# Patient Record
Sex: FEMALE | Race: BLACK OR AFRICAN AMERICAN | NOT HISPANIC OR LATINO | Employment: FULL TIME | URBAN - METROPOLITAN AREA
[De-identification: names, ages, dates, MRNs, and addresses within clinical notes are randomized per-mention and may not be internally consistent; named-entity substitution may affect disease eponyms.]

---

## 2017-01-24 RX ORDER — ATENOLOL AND CHLORTHALIDONE TABLET 50; 25 MG/1; MG/1
TABLET ORAL
Qty: 90 TABLET | Refills: 0 | Status: SHIPPED | OUTPATIENT
Start: 2017-01-24 | End: 2017-06-19 | Stop reason: SDUPTHER

## 2017-03-06 RX ORDER — ERGOCALCIFEROL 1.25 MG/1
CAPSULE ORAL
Qty: 20 CAPSULE | Refills: 0 | Status: SHIPPED | OUTPATIENT
Start: 2017-03-06 | End: 2017-04-17 | Stop reason: SDUPTHER

## 2017-04-17 RX ORDER — ERGOCALCIFEROL 1.25 MG/1
CAPSULE ORAL
Qty: 20 CAPSULE | Refills: 0 | Status: SHIPPED | OUTPATIENT
Start: 2017-04-17 | End: 2017-05-18 | Stop reason: SDUPTHER

## 2017-04-24 DIAGNOSIS — Z12.31 OTHER SCREENING MAMMOGRAM: ICD-10-CM

## 2017-05-18 RX ORDER — ERGOCALCIFEROL 1.25 MG/1
CAPSULE ORAL
Qty: 20 CAPSULE | Refills: 0 | Status: SHIPPED | OUTPATIENT
Start: 2017-05-18 | End: 2017-09-11

## 2017-06-19 RX ORDER — ATENOLOL AND CHLORTHALIDONE TABLET 50; 25 MG/1; MG/1
TABLET ORAL
Qty: 90 TABLET | Refills: 0 | Status: SHIPPED | OUTPATIENT
Start: 2017-06-19 | End: 2017-10-08 | Stop reason: SDUPTHER

## 2017-09-11 RX ORDER — ERGOCALCIFEROL 1.25 MG/1
CAPSULE ORAL
Qty: 20 CAPSULE | Refills: 0 | Status: SHIPPED | OUTPATIENT
Start: 2017-09-11 | End: 2017-12-08 | Stop reason: SDUPTHER

## 2017-10-09 RX ORDER — ATENOLOL AND CHLORTHALIDONE TABLET 50; 25 MG/1; MG/1
TABLET ORAL
Qty: 90 TABLET | Refills: 3 | Status: SHIPPED | OUTPATIENT
Start: 2017-10-09 | End: 2018-10-17 | Stop reason: SDUPTHER

## 2017-12-11 RX ORDER — ERGOCALCIFEROL 1.25 MG/1
CAPSULE ORAL
Qty: 20 CAPSULE | Refills: 0 | Status: SHIPPED | OUTPATIENT
Start: 2017-12-11 | End: 2018-04-28 | Stop reason: SDUPTHER

## 2018-04-10 ENCOUNTER — TELEPHONE (OUTPATIENT)
Dept: INTERNAL MEDICINE | Facility: CLINIC | Age: 63
End: 2018-04-10

## 2018-04-10 DIAGNOSIS — E78.2 MIXED HYPERLIPIDEMIA: ICD-10-CM

## 2018-04-10 DIAGNOSIS — Z12.31 ENCOUNTER FOR SCREENING MAMMOGRAM FOR BREAST CANCER: ICD-10-CM

## 2018-04-10 DIAGNOSIS — I10 ESSENTIAL HYPERTENSION: ICD-10-CM

## 2018-04-10 DIAGNOSIS — Z00.00 ANNUAL PHYSICAL EXAM: Primary | ICD-10-CM

## 2018-04-10 RX ORDER — TRAVOPROST 0.04 MG/ML
SOLUTION/ DROPS OPHTHALMIC
COMMUNITY
Start: 2018-04-02 | End: 2018-04-17 | Stop reason: SDUPTHER

## 2018-04-13 NOTE — TELEPHONE ENCOUNTER
----- Message from Joan Lyman sent at 4/12/2018  4:20 PM CDT -----  Contact: self/[phone]  Pt is returning a call from the office. Please advise.      Thanks

## 2018-04-17 ENCOUNTER — OFFICE VISIT (OUTPATIENT)
Dept: INTERNAL MEDICINE | Facility: CLINIC | Age: 63
End: 2018-04-17
Payer: COMMERCIAL

## 2018-04-17 ENCOUNTER — HOSPITAL ENCOUNTER (OUTPATIENT)
Dept: RADIOLOGY | Facility: HOSPITAL | Age: 63
Discharge: HOME | End: 2018-04-17
Attending: INTERNAL MEDICINE
Payer: COMMERCIAL

## 2018-04-17 VITALS
BODY MASS INDEX: 32.35 KG/M2 | HEIGHT: 63 IN | WEIGHT: 182.56 LBS | DIASTOLIC BLOOD PRESSURE: 94 MMHG | SYSTOLIC BLOOD PRESSURE: 142 MMHG

## 2018-04-17 DIAGNOSIS — E78.5 HYPERLIPIDEMIA, UNSPECIFIED HYPERLIPIDEMIA TYPE: ICD-10-CM

## 2018-04-17 DIAGNOSIS — Z00.00 ROUTINE CHECK-UP: ICD-10-CM

## 2018-04-17 DIAGNOSIS — I10 ESSENTIAL HYPERTENSION: Primary | ICD-10-CM

## 2018-04-17 DIAGNOSIS — Z12.39 BREAST CANCER SCREENING: ICD-10-CM

## 2018-04-17 PROCEDURE — 99999 PR PBB SHADOW E&M-EST. PATIENT-LVL III: CPT | Mod: PBBFAC,,, | Performed by: INTERNAL MEDICINE

## 2018-04-17 PROCEDURE — 77067 SCR MAMMO BI INCL CAD: CPT | Mod: TC

## 2018-04-17 PROCEDURE — 77067 SCR MAMMO BI INCL CAD: CPT | Mod: 26,,, | Performed by: RADIOLOGY

## 2018-04-17 PROCEDURE — 99214 OFFICE O/P EST MOD 30 MIN: CPT | Mod: S$GLB,,, | Performed by: INTERNAL MEDICINE

## 2018-04-17 NOTE — PROGRESS NOTES
Subjective:       Patient ID: Razia Keyes is a 63 y.o. female.    Chief Complaint: Annual Exam    HPI the patient is a 63-year-old female comes in for an annual checkup.  She has several issues for which she wishes to have attention.  The patient reports that her ophthalmologist recommend that she have a sleep study due to unusual findings in her eyes.  The patient does report that she snores a great deal.  She does not report any apneic episodes or daytime somnolence.  The patient reports some problems with pain in her wrists bilaterally.  This is present at random times throughout the day.  Is been going on for quite some time.  The patient reports Cramps at night.  The patient reports blood pressures at home with systolics around 140.  Review of Systems   Constitutional: Negative for fatigue, fever and unexpected weight change.   HENT: Negative for congestion, hearing loss and sore throat.    Eyes: Negative for visual disturbance.   Respiratory: Negative for cough, shortness of breath and wheezing.    Cardiovascular: Negative for chest pain and palpitations.   Gastrointestinal: Negative for abdominal distention, abdominal pain, blood in stool, diarrhea, nausea and vomiting.   Genitourinary: Negative for difficulty urinating, dysuria, frequency and hematuria.   Musculoskeletal: Positive for arthralgias and myalgias. Negative for joint swelling.   Skin: Negative for color change and rash.   Neurological: Negative for dizziness, syncope, weakness and headaches.   Hematological: Negative for adenopathy.   Psychiatric/Behavioral: Negative for confusion, decreased concentration and suicidal ideas.       Objective:      Physical Exam   Constitutional: She is oriented to person, place, and time. She appears well-developed and well-nourished.   HENT:   Head: Normocephalic.   Mouth/Throat: No oropharyngeal exudate.   Eyes: Conjunctivae and EOM are normal. Pupils are equal, round, and reactive to light. Right eye exhibits  no discharge. Left eye exhibits no discharge. No scleral icterus.   Neck: No JVD present. No tracheal deviation present. No thyromegaly present.   Cardiovascular: Normal rate, regular rhythm and normal heart sounds.  Exam reveals no gallop and no friction rub.    No murmur heard.  Pulmonary/Chest: Effort normal and breath sounds normal. No respiratory distress. She has no wheezes. She has no rales. She exhibits no tenderness.   Abdominal: Soft. Bowel sounds are normal. She exhibits no distension and no mass. There is no tenderness. There is no rebound and no guarding.   Musculoskeletal: Normal range of motion. She exhibits no edema or tenderness.   Negative Tinel's sign bilaterally.  Good radial pulses bilaterally.  No wrist tenderness.  Full range of motion at the wrist.   Lymphadenopathy:     She has no cervical adenopathy.   Neurological: She is alert and oriented to person, place, and time. She displays normal reflexes. No cranial nerve deficit. She exhibits normal muscle tone. Coordination normal.   Skin: Skin is warm and dry. No rash noted. No erythema. No pallor.   Psychiatric: She has a normal mood and affect. Her behavior is normal. Judgment and thought content normal.       Assessment:       1. Essential hypertension    2. Hyperlipidemia, unspecified hyperlipidemia type    3. Routine check-up      4.  Mild wrist arthritis  5.  Possible sleep apnea  Plan:       1.  Hepatitis C, CMP, fasting lipids, CBC  2.  Screening colonoscopy  3.  Sleep study  4.  Screening mammogram  5.  Calcium supplements 500-600 mg by mouth daily to help with nighttime cramps  6.  Return to clinic in 1 year

## 2018-04-18 ENCOUNTER — PATIENT MESSAGE (OUTPATIENT)
Dept: INTERNAL MEDICINE | Facility: CLINIC | Age: 63
End: 2018-04-18

## 2018-04-23 RX ORDER — TRAVOPROST 0.04 MG/ML
SOLUTION/ DROPS OPHTHALMIC
Qty: 2.5 ML | Refills: 0 | Status: SHIPPED | OUTPATIENT
Start: 2018-04-23 | End: 2018-10-17 | Stop reason: SDUPTHER

## 2018-04-26 ENCOUNTER — TELEPHONE (OUTPATIENT)
Dept: SLEEP MEDICINE | Facility: OTHER | Age: 63
End: 2018-04-26

## 2018-04-28 ENCOUNTER — PATIENT MESSAGE (OUTPATIENT)
Dept: INTERNAL MEDICINE | Facility: CLINIC | Age: 63
End: 2018-04-28

## 2018-04-30 RX ORDER — ERGOCALCIFEROL 1.25 MG/1
CAPSULE ORAL
Qty: 20 CAPSULE | Refills: 0 | Status: SHIPPED | OUTPATIENT
Start: 2018-04-30 | End: 2018-10-17 | Stop reason: SDUPTHER

## 2018-05-01 ENCOUNTER — TELEPHONE (OUTPATIENT)
Dept: SLEEP MEDICINE | Facility: OTHER | Age: 63
End: 2018-05-01

## 2018-05-07 ENCOUNTER — TELEPHONE (OUTPATIENT)
Dept: SLEEP MEDICINE | Facility: OTHER | Age: 63
End: 2018-05-07

## 2018-05-11 ENCOUNTER — TELEPHONE (OUTPATIENT)
Dept: SLEEP MEDICINE | Facility: OTHER | Age: 63
End: 2018-05-11

## 2018-05-14 ENCOUNTER — PATIENT MESSAGE (OUTPATIENT)
Dept: ADMINISTRATIVE | Facility: OTHER | Age: 63
End: 2018-05-14

## 2018-06-11 RX ORDER — TRAVOPROST OPHTHALMIC SOLUTION 0.04 MG/ML
SOLUTION OPHTHALMIC
Qty: 2.5 ML | Refills: 0 | OUTPATIENT
Start: 2018-06-11

## 2018-06-15 ENCOUNTER — TELEPHONE (OUTPATIENT)
Dept: SLEEP MEDICINE | Facility: OTHER | Age: 63
End: 2018-06-15

## 2018-10-03 RX ORDER — ERGOCALCIFEROL 1.25 MG/1
50000 CAPSULE ORAL
Qty: 20 CAPSULE | Refills: 0 | OUTPATIENT
Start: 2018-10-03

## 2018-10-17 ENCOUNTER — OFFICE VISIT (OUTPATIENT)
Dept: INTERNAL MEDICINE | Facility: CLINIC | Age: 63
End: 2018-10-17
Payer: COMMERCIAL

## 2018-10-17 VITALS
OXYGEN SATURATION: 99 % | HEART RATE: 64 BPM | DIASTOLIC BLOOD PRESSURE: 80 MMHG | BODY MASS INDEX: 31.41 KG/M2 | WEIGHT: 177.25 LBS | HEIGHT: 63 IN | SYSTOLIC BLOOD PRESSURE: 130 MMHG

## 2018-10-17 DIAGNOSIS — H40.1131 PRIMARY OPEN ANGLE GLAUCOMA (POAG) OF BOTH EYES, MILD STAGE: Chronic | ICD-10-CM

## 2018-10-17 DIAGNOSIS — E55.9 VITAMIN D DEFICIENCY: ICD-10-CM

## 2018-10-17 DIAGNOSIS — J30.1 SEASONAL ALLERGIC RHINITIS DUE TO POLLEN: Chronic | ICD-10-CM

## 2018-10-17 DIAGNOSIS — Z12.11 COLON CANCER SCREENING: ICD-10-CM

## 2018-10-17 DIAGNOSIS — I10 ESSENTIAL HYPERTENSION: Primary | Chronic | ICD-10-CM

## 2018-10-17 PROBLEM — H40.1190 PRIMARY OPEN ANGLE GLAUCOMA (POAG): Chronic | Status: ACTIVE | Noted: 2018-10-17

## 2018-10-17 PROCEDURE — 99999 PR PBB SHADOW E&M-EST. PATIENT-LVL III: CPT | Mod: PBBFAC,,, | Performed by: INTERNAL MEDICINE

## 2018-10-17 PROCEDURE — 99214 OFFICE O/P EST MOD 30 MIN: CPT | Mod: S$GLB,,, | Performed by: INTERNAL MEDICINE

## 2018-10-17 RX ORDER — TRAVOPROST OPHTHALMIC SOLUTION 0.04 MG/ML
1 SOLUTION OPHTHALMIC NIGHTLY
Qty: 2 BOTTLE | Refills: 11 | Status: SHIPPED | OUTPATIENT
Start: 2018-10-17 | End: 2019-10-24 | Stop reason: SDUPTHER

## 2018-10-17 RX ORDER — ERGOCALCIFEROL 1.25 MG/1
50000 CAPSULE ORAL
Qty: 13 CAPSULE | Refills: 4 | Status: SHIPPED | OUTPATIENT
Start: 2018-10-17 | End: 2019-08-06 | Stop reason: SDUPTHER

## 2018-10-17 RX ORDER — ATENOLOL AND CHLORTHALIDONE TABLET 50; 25 MG/1; MG/1
1 TABLET ORAL DAILY
Qty: 90 TABLET | Refills: 4 | Status: SHIPPED | OUTPATIENT
Start: 2018-10-17 | End: 2019-08-06 | Stop reason: SDUPTHER

## 2018-10-17 RX ORDER — LEVOCETIRIZINE DIHYDROCHLORIDE 5 MG/1
5 TABLET, FILM COATED ORAL DAILY PRN
Qty: 30 TABLET | Refills: 11 | Status: SHIPPED | OUTPATIENT
Start: 2018-10-17 | End: 2019-10-14 | Stop reason: SDUPTHER

## 2018-10-17 NOTE — PROGRESS NOTES
INTERNAL MEDICINE CLINIC    Initial Visit to Establish Care    PRESENTING HISTORY     Previous PCP: Shaq Sheppard MD  Chief Complaint/Reason for Visit:     Chief Complaint   Patient presents with    Establish Care     History of Present Illness & ROS : Ms. Razia Keyes is a 63 y.o. female.      She complains of mild middle abdominal pain sometimes.  No change in bowel habits.    No chest pain or SOB.  Had visit with Dr. Swann in April with labs.  He is retired now.      PAST HISTORY:     Past Medical History:   Diagnosis Date    Essential hypertension 11/27/2012    Mixed hyperlipidemia 2/3/2015    Primary open angle glaucoma (POAG) 10/17/2018    Seasonal allergic rhinitis due to pollen 10/17/2018       Past Surgical History:   Procedure Laterality Date    COLONOSCOPY  2017    Normal 2007. She should return in 10 years    HYSTERECTOMY      Age 27       Family History   Problem Relation Age of Onset    Lymphoma Father     Diabetes Mother     Dementia Mother     Anesthesia problems Maternal Uncle        Social History     Socioeconomic History    Marital status: Single     Spouse name: None    Number of children: 0    Years of education: None    Highest education level: None   Social Needs    Financial resource strain: None    Food insecurity - worry: None    Food insecurity - inability: None    Transportation needs - medical: None    Transportation needs - non-medical: None   Occupational History     Employer: [employer]   Tobacco Use    Smoking status: Never Smoker    Smokeless tobacco: Never Used    Tobacco comment: 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 She exercises by walking about at school and in the stairways.   Substance and Sexual Activity    Alcohol use: Yes     Binge frequency: Less than monthly     Comment: Social    Drug use: No    Sexual activity: Not Currently     Partners: Male   Other Topics  Concern    None   Social History Narrative    None       MEDICATIONS & ALLERGIES:     Current Outpatient Medications on File Prior to Visit   Medication Sig Dispense Refill    atenolol-chlorthalidone (TENORETIC) 50-25 mg Tab TAKE 1 TABLET BY MOUTH DAILY 90 tablet 3    TRAVATAN Z 0.004 % Drop PLACE 1 DROP INTO BOTH EYES EVERY NIGHT AT BEDTIME 2.5 mL 0    travoprost, benzalkonium, (TRAVATAN) 0.004 % ophthalmic solution Place 1 drop into both eyes every evening.      VITAMIN D2 50,000 unit capsule TAKE ONE CAPSULE BY MOUTH ONCE A WEEK 20 capsule 0     No current facility-administered medications on file prior to visit.         Review of patient's allergies indicates:   Allergen Reactions   None    Medications Reconciliation:   I have reconciled the patient's home medications with the patient/family. I have updated all changes.  Refer to After-Visit Medication List.    OBJECTIVE:     Vital Signs:  Vitals:    10/17/18 1359   BP: 130/80   Pulse: 64     Wt Readings from Last 1 Encounters:   10/17/18 1349 80.4 kg (177 lb 4 oz)      Body mass index is 31.4 kg/m².     Physical Exam:  General: Well developed, well nourished. No distress.  HEENT: Head is normocephalic, atraumatic; ears are normal.  Normal TM.  Eyes: Clear conjunctiva. No redness.  Neck: Supple, symmetrical neck; trachea midline.  Lungs: Clear to auscultation bilaterally.  No wheezing. Normal respiratory effort.  Cardiovascular: Heart with regular rate and rhythm.  S1 S2.  Extremities: No LE edema. Pulses 2+ and symmetric.   Abdomen: Abdomen is soft, non-tender non-distended with normal bowel sounds.  Skin: Skin color, texture, turgor normal. No rashes.  Musculoskeletal: Normal gait. No muscle tenderness.  Lymph Nodes: No cervical or supraclavicular adenopathy.  Neurologic: Normal strength and tone. No focal numbness or weakness.   Psychiatric: Normal affect. Alert and oriented X 3.    Laboratory  Lab Results   Component Value Date    WBC 5.61 04/17/2018     HGB 13.1 04/17/2018    HCT 39.5 04/17/2018     (H) 04/17/2018    CHOL 221 (H) 04/17/2018    TRIG 75 04/17/2018    HDL 71 04/17/2018    ALT 11 04/17/2018    AST 35 04/17/2018     04/17/2018    K 3.8 04/17/2018     04/17/2018    CREATININE 1.1 04/17/2018    BUN 20 04/17/2018    CO2 29 04/17/2018    TSH 2.307 06/25/2012     Lipid Panel  Results for orders placed or performed in visit on 04/17/18   LIPID PANEL   Result Value Ref Range    Cholesterol 221 (H) 120 - 199 mg/dL    Triglycerides 75 30 - 150 mg/dL    HDL 71 40 - 75 mg/dL    LDL Cholesterol 135.0 63.0 - 159.0 mg/dL    HDL/Chol Ratio 32.1 20.0 - 50.0 %    Total Cholesterol/HDL Ratio 3.1 2.0 - 5.0    Non-HDL Cholesterol 150 mg/dL   Results for AJ BASSETT (MRN [id number]) as of 10/17/2018 14:01   Ref. Range 8/11/2009 14:30 2/3/2015 14:52 1/22/2016 11:35   Vit D, 25-Hydroxy Latest Ref Range: 30 - 96 ng/mL 14 (L) 13 (L) 28 (L)       ASSESSMENT & PLAN:     Essential hypertension  - BP controlled.  Refilled:  -     atenolol-chlorthalidone (TENORETIC) 50-25 mg Tab; Take 1 tablet by mouth once daily.  Dispense: 90 tablet; Refill: 4    Seasonal allergic rhinitis due to pollen  - Rx:  -     levocetirizine (XYZAL) 5 MG tablet; Take 1 tablet (5 mg total) by mouth daily as needed for Allergies.  Dispense: 30 tablet; Refill: 11    Vitamin D deficiency  - Refilled:  -     ergocalciferol (VITAMIN D2) 50,000 unit Cap; Take 1 capsule (50,000 Units total) by mouth every 7 days.  Dispense: 13 capsule; Refill: 4    Primary open angle glaucoma (POAG) of both eyes, mild stage  - Refilled med.  She is followed by Optometry.  -     travoprost (TRAVATAN Z) 0.004 % ophthalmic solution; Place 1 drop into both eyes every evening.  Dispense: 2 Bottle; Refill: 11    Colon cancer screening  -     Case request GI: COLONOSCOPY      Preventive Care:  Refused Tdap, Shingle vaccine and Flu vaccine.  Request for C-scope.    Scheduled Follow-up :  6 months.    After Visit  Medication List :     Medication List           Accurate as of 10/17/18  2:17 PM. If you have any questions, ask your nurse or doctor.               START taking these medications    levocetirizine 5 MG tablet  Commonly known as:  XYZAL  Take 1 tablet (5 mg total) by mouth daily as needed for Allergies.  Started by:  Shaq Sheppard MD        CHANGE how you take these medications    atenolol-chlorthalidone 50-25 mg Tab  Commonly known as:  TENORETIC  Take 1 tablet by mouth once daily.  What changed:  See the new instructions.  Changed by:  Shaq Sheppard MD     ergocalciferol 50,000 unit Cap  Commonly known as:  VITAMIN D2  Take 1 capsule (50,000 Units total) by mouth every 7 days.  What changed:    · medication strength  · See the new instructions.  Changed by:  Shaq Sheppard MD        CONTINUE taking these medications    travoprost 0.004 % ophthalmic solution  Commonly known as:  TRAVATAN Z  Place 1 drop into both eyes every evening.           Where to Get Your Medications      These medications were sent to [facility]/pharmacy #[id number] - [place], [place] - [address]  [address], [place] [address]    Phone:  [phone]   · atenolol-chlorthalidone 50-25 mg Tab  · ergocalciferol 50,000 unit Cap  · levocetirizine 5 MG tablet  · travoprost 0.004 % ophthalmic solution         Signing Physician:  Shaq Sheppard MD

## 2018-10-31 DIAGNOSIS — Z12.11 SPECIAL SCREENING FOR MALIGNANT NEOPLASMS, COLON: Primary | ICD-10-CM

## 2018-10-31 RX ORDER — SODIUM, POTASSIUM,MAG SULFATES 17.5-3.13G
1 SOLUTION, RECONSTITUTED, ORAL ORAL DAILY
Qty: 1 KIT | Refills: 0 | Status: SHIPPED | OUTPATIENT
Start: 2018-10-31 | End: 2018-11-02

## 2018-11-16 ENCOUNTER — PATIENT MESSAGE (OUTPATIENT)
Dept: SLEEP MEDICINE | Facility: CLINIC | Age: 63
End: 2018-11-16

## 2018-11-19 ENCOUNTER — OFFICE VISIT (OUTPATIENT)
Dept: SLEEP MEDICINE | Facility: CLINIC | Age: 63
End: 2018-11-19
Payer: COMMERCIAL

## 2018-11-19 VITALS
DIASTOLIC BLOOD PRESSURE: 85 MMHG | HEART RATE: 78 BPM | WEIGHT: 177.5 LBS | SYSTOLIC BLOOD PRESSURE: 137 MMHG | HEIGHT: 63 IN | BODY MASS INDEX: 31.45 KG/M2

## 2018-11-19 DIAGNOSIS — F32.A DEPRESSION, UNSPECIFIED DEPRESSION TYPE: ICD-10-CM

## 2018-11-19 DIAGNOSIS — E66.9 CLASS 1 OBESITY WITH SERIOUS COMORBIDITY AND BODY MASS INDEX (BMI) OF 31.0 TO 31.9 IN ADULT, UNSPECIFIED OBESITY TYPE: ICD-10-CM

## 2018-11-19 DIAGNOSIS — G47.30 SLEEP APNEA, UNSPECIFIED TYPE: Primary | ICD-10-CM

## 2018-11-19 DIAGNOSIS — F41.9 ANXIETY: ICD-10-CM

## 2018-11-19 DIAGNOSIS — F51.04 PSYCHOPHYSIOLOGICAL INSOMNIA: ICD-10-CM

## 2018-11-19 PROCEDURE — 99999 PR PBB SHADOW E&M-EST. PATIENT-LVL IV: CPT | Mod: PBBFAC,,, | Performed by: INTERNAL MEDICINE

## 2018-11-19 PROCEDURE — 99204 OFFICE O/P NEW MOD 45 MIN: CPT | Mod: S$GLB,,, | Performed by: INTERNAL MEDICINE

## 2018-11-19 NOTE — LETTER
November 19, 2018      Mckenzie Rodriguez, OD  [address]  [address]  [facility] [address]           [facility]  [address]  [facility] [address]  Phone: [phone]          Patient: Razia Keyes   MR Number: [id number]   YOB: 1955   Date of Visit: 11/19/2018       Dear Dr. Mckenzie Rodriguez:    Thank you for referring Razia Keyes to me for evaluation. Attached you will find relevant portions of my assessment and plan of care.    If you have questions, please do not hesitate to call me. I look forward to following Razia Keyes along with you.    Sincerely,    Maria G Ramírez MD    Enclosure  CC:  No Recipients    If you would like to receive this communication electronically, please contact [email or web address] or [phone] to request more information on [employer] Link access.    For providers and/or their staff who would like to refer a patient to [facility], please contact us through our one-stop-shop provider referral line, [facility], at [phone].    If you feel you have received this communication in error or would no longer like to receive these types of communications, please e-mail [email or web address]

## 2018-11-19 NOTE — PROGRESS NOTES
This 63 y.o. female patient presents for the evaluation of possible obstructive sleep apnea.    Referring Provider: Dr. Mckenzie Rodriguez     Pt is referred for evaluation of ZENAIDA by PCP. .  Pt has been fatigued and tired on and off for the past 20 years . +snoring for the past 20 years, negative witnessed apneas, wakes 4 times unknown reason/nocturia, takes a few min's - half hrs to go  back to sleep, + tired on waking up some time, no caffeine intake, - urge to move legs but some times  leg kicking. + on and off nasal congestion. - cataplexy,-  hallucination,- sleep paralysis. - acting out dreams. - vivid dream.  Pt prefer to sleep on the side.     Pt denies having  head injury and hospitalization due to viral infection. - Sleep walking/talking. Memory is good. Pt feels content.  Denies any sleep related injury. Night mare rarely.Pt does not have  morning headache.  Pt doesn't  feel  Drowsy while driving. Pt is nose breather. Pt does not wake up to eat at night. Pt doesn't sweat at night. Pt doesn't  grind teeth.  She lays in bed for long time and watching TV, watches clock when wakes up. She does ruminates and has hard time switching her mind off. She started to have difficulty in sleeping when she lost her mother. She was a night owl in the past and it started along with the insomnia. She sleep better when travelling.      PHQ9 11/18/2018   Little interest or pleasure in doing things: Not at all   Feeling down, depressed or hopeless: More than half the days   Trouble falling asleep, staying asleep, or sleeping too much: More than half the days   Feeling tired or having little energy: Not at all   Poor appetite or overeating: Not at all   Feeling bad about yourself- or that you are a failure or have let yourself or family down More than half the days   Trouble concentrating on things, such as reading the newspaper or watching television: Not at all   Moving or speaking so slowly that other people could have  noticed. Or the opposite- being so fidgety or restless that you have been moving around a lot more than usual: Nearly every day   Thoughts that you would be better off dead or hurting yourself in some way: Not at all   If you indicated you have experienced any of the aforementioned problems, how difficult have these problems made it for you to do your work, take care of things at home or get along with other people? Not difficult at all   Total Score 9     BEATRIZ-7 Score: (P) 15  Interpretation: (P) Severe Anxiety        EPWORTH SLEEPINESS SCALE 11/18/2018   Sitting and reading 1   Watching TV 2   Sitting, inactive in a public place (e.g. a theatre or a meeting) 0   As a passenger in a car for an hour without a break 3   Lying down to rest in the afternoon when circumstances permit 1   Sitting and talking to someone 0   Sitting quietly after a lunch without alcohol 2   In a car, while stopped for a few minutes in traffic 0   Total score 9     Sleep Clinic New Patient 11/18/2018   What time do you go to bed on a week day? (Give a range) Between 10:00 pm-11:30pm   What time do you go to bed on a day off? (Give a range) 11:00 pm to midnight   How long does it take you to fall asleep? (Give a range) Sometimes an hour or an 1 hr. 1/2   On average, how many times per night do you wake up? 4 times per night   How long does it take you to fall back into sleep? (Give a range) 1//2 hr.   What time do you wake up to start your day on a week day? (Give a range) 5 am.   What time do you wake up to start your day on a day off? (Give a range) 8 or 9 am.   What time do you get out of bed? (Give a range) 10:00 am   On average, how many hours do you sleep? 5 hrs.   On average, how many naps do you take per day? When I'm off like now, 2 naps per day.   Rate your sleep quality from 0 to 5 (0-poor, 5-great). 4   Have you experienced:  Weight loss?   How much weight have you lost or gained (in lbs.) in the last year? Lost 2 pds.   On  "average, how many times per night do you go to the bathroom?  Once   Have you ever had a sleep study/CPAP machine/surgery for sleep apnea? No   Have you ever had a CPAP machine for sleep apnea? No   Have you ever had surgery for sleep apnea? No     Sleep Clinic ROS  11/18/2018   Difficulty breathing through the nose?  No   Sore throat or dry mouth in the morning? No   Irregular or very fast heart beat?  No   Shortness of breath?  No   Acid reflux? No   Body aches and pains?  No   Morning headaches? No   Dizziness? No   Mood changes?  No   Do you exercise?  Sometimes   Do you feel like moving your legs a lot?  No       SLEEP ROUTINE:  Bed partner: alone  Daytime naps: on weekends 1 hr and refreshing.     Past Medical History:   Diagnosis Date    Essential hypertension 11/27/2012    Mixed hyperlipidemia 2/3/2015    Primary open angle glaucoma (POAG) 10/17/2018    Seasonal allergic rhinitis due to pollen 10/17/2018       Past Surgical History:   Procedure Laterality Date    COLONOSCOPY  2017    Normal 2007. 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 She exercises by walking about at school and in the stairways.   Substance Use Topics    Alcohol use: Yes     Binge frequency: Less than monthly     Comment: Social    Drug use: No       ALLERGIES: Reviewed in EPIC    CURRENT MEDICATIONS: Reviewed in EPIC    REVIEW OF SYSTEMS:  Sleep related symptoms as per HPI;        PHYSICAL EXAM:  /85   Pulse 78   Ht 5' 3" (1.6 m)   Wt 80.5 kg (177 lb 7.5 oz)   BMI 31.44 kg/m²   GENERAL: Well groomed; Normally " developed;  HEENT: Conjunctivae are non-erythematous; Pupils equal, round, and reactive to light;    Nose is symmetrical; Nasal mucosa is congested b/l ; Septum is midline;    Turbinates are normal; Nasal airflow is normal;    Posterior pharynx is pink; Posterior palate is normal; Modified Mallampati: IV   Uvula is normal; Tongue is normal; Tonsils +1;    Dentition is fair; No TMJ tenderness;    Jaw opening and protrusion without click and without discomfort.  NECK: Supple. No thyromegaly. No palpable nodes. Neck circumference in inches is 17.3 inch   SKIN: On face and neck: No abrasions, no rashes, no lesions.     No subcutaneous nodules are palpable.  RESPIRATORY: Chest is clear to auscultation.     Normal chest expansion and non-labored breathing at rest.  CARDIOVASCULAR: Normal S1, S2.  No murmurs, gallops or rubs.   No carotid bruits bilaterally.  EXTREMITIES: No clubbing. No cyanosis. Edema is absent.   NEURO: Oriented to time, place and person.    Normal attention span and concentration.  Station normal. Gait normal.  PSYCH: Affect is full. Mood is normal.    ASSESSMENT:    1. Sleep Apnea, unspecified. The patient symptomatically has snoring and excessive daytime sleepiness with exam findings of a crowded oral airway with medical co-morbidities of hypertension and obesity. This warrants further investigation for possible obstructive sleep apnea.    Sleep apnea, unspecified type    Psychophysiological insomnia    Anxiety    Class 1 obesity with serious comorbidity and body mass index (BMI) of 31.0 to 31.9 in adult, unspecified obesity type    Depression, unspecified depression type             PLAN:  Sleep apnea unspecified:  Diagnostic: Home sleep study. The nature of this procedure and its indication was discussed with the patient. Suggested to avoid sleeping supine and suggested to loose weight      Will advise to start using nasal saline every day one - two squirt each nostril if that does not work  Use  Flonase 1-2 sprays in each nostril at bedtime to help with nasal congestion. You need to use every day for at least a month to determine if it will be beneficial for you. Watch for nose bleeds. If this occurs, stop the medication and call the office.      Education: During our discussion today, we talked about the etiology of obstructive sleep apnea as well as the potential ramifications of untreated sleep apnea, which could include daytime sleepiness, hypertension, heart disease and/or stroke.  We discussed potential treatment options, which could include weight loss, body positioning, use of an oral appliance, continuous positive airway pressure (CPAP), EPAP, or referral for surgical consideration.    Precautions: The patient was advised to abstain from driving should they feel sleepy or drowsy.      Psychophysiological insomnia  - Discussed the good sleep hygiene.  - Dicussed the stimulus control.   - Suggested to have to do list made prior to going to sleep.   - Suggested to have assessment for anxiety as playing a role in regards to  insomnia and possible ZENAIDA will be doing home sleep study.   - Provided info about the vianey for CBTI-    - Provided info to handle maladaptive thoughts and relaxation exercises.   - If feels the need can take Melatonin 2 mg 2 hrs prior to bed time.     Obesity:  General weight loss/lifestyle modification strategies discussed (elicit support from others; identify saboteurs; non-food rewards, etc).  Diet interventions: qualitative changes (increase low-fat,  high-fiber foods).    Anxiety:  Advise to discuss the treatment option with PCP about anxiety management and seek/contiue counseling through a therapist.    Depression:  Advise to discuss the treatment option with PCP about anxiety management and seek/contiue counseling through a therapist.    More than 20 minutes of this 40 minutes visit was spent in counseling:       Follow up: 6 weeks        Maria G Ramírez MD,  FACP  Phone: [phone]  Fax: [phone]

## 2018-11-19 NOTE — PATIENT INSTRUCTIONS
Sleep Hygiene Practices    1. Try going to bed only when you are drowsy.    ?    2. If you are unable to fall asleep or stay asleep, leave your bedroom and engage in a quiet activity elsewhere. Do not permit yourself to fall asleep outside the bedroom. Return to bed when and only when you are sleepy. Repeat this process as often as necessary throughout night.    3. Maintain regular wake-up time, even on days off work & weekends    4. Use your bedroom for sleep and sex    5. Avoid napping during the daytime. If daytime sleepiness becomes overwhelming, limit nap time to a single nap of less than 1hr, no later than 3pm.    6. Distract your mind. Avoid clock watching. Lying in bed unable to sleep and frustrated needs to be avoided. Try reading or watching a videotape or listening to books on tape. It may be necessary to go into another room to do these.    7. Avoid caffeine within 4-6hrs of bedtime    8. Avoid use of nicotine close to bedtime    9. do not drink alcoholic beverages within 4-6hrs of bedtime    10. While a light snack before bedtime can help promote sound sleep, avoid large meals.    11. Obtain regular exercise, but avoid strenuous exercise within 4hrs of bedtime    12. Minimize light, noise, and extremes in temperature in the bedroom.    13. Precautions: The patient was advised to abstain from driving should they feel sleepy or drowsy.  ?    - Discussed the good sleep hygiene.  - Dicussed the stimulus control.   - Suggested to have to do list made prior to going to sleep.   - Suggested to have assessment for anxiety as playing a role in regards to  insomnia and possible ZENAIDA will be doing home sleep study.   - Provided info about the vianey for CBTI-    - Provided info to handle maladaptive thoughts and relaxation exercises.   - If feels the need can take Melatonin 2 mg 2 hrs prior to bed time.       Kari or Gaetano will contact you to schedule your sleep study. Their number is [phone] (ext 2). The  [facility] is located on 7th floor of the [facility].    We will call you when the sleep study results are ready - if you have not heard from us by 2 weeks from the date of the study, please call [phone] (ext 1).    Will advise to start using nasal saline every day one - two squirt each nostril if that does not work  Use Flonase 1-2 sprays in each nostril at bedtime to help with nasal congestion. You need to use every day for at least a month to determine if it will be beneficial for you. Watch for nose bleeds. If this occurs, stop the medication and call the office.      You are advised to abstain from driving should you feel sleepy or drowsy.          * This information is provided had been directly obtained from the American [employer] wellness booklet on sleep hygiene. ([facility], [address] [place] [address]

## 2018-11-21 ENCOUNTER — HOSPITAL ENCOUNTER (OUTPATIENT)
Facility: HOSPITAL | Age: 63
Discharge: HOME | End: 2018-11-21
Attending: COLON & RECTAL SURGERY | Admitting: COLON & RECTAL SURGERY
Payer: COMMERCIAL

## 2018-11-21 ENCOUNTER — ANESTHESIA EVENT (OUTPATIENT)
Dept: ENDOSCOPY | Facility: HOSPITAL | Age: 63
End: 2018-11-21
Payer: COMMERCIAL

## 2018-11-21 ENCOUNTER — ANESTHESIA (OUTPATIENT)
Dept: ENDOSCOPY | Facility: HOSPITAL | Age: 63
End: 2018-11-21
Payer: COMMERCIAL

## 2018-11-21 VITALS
TEMPERATURE: 98 F | SYSTOLIC BLOOD PRESSURE: 148 MMHG | HEIGHT: 63 IN | BODY MASS INDEX: 31.36 KG/M2 | DIASTOLIC BLOOD PRESSURE: 71 MMHG | WEIGHT: 177 LBS | RESPIRATION RATE: 18 BRPM | HEART RATE: 72 BPM | OXYGEN SATURATION: 100 %

## 2018-11-21 DIAGNOSIS — Z12.11 SCREENING FOR COLON CANCER: ICD-10-CM

## 2018-11-21 PROCEDURE — 25000003 PHARM REV CODE 250: Performed by: NURSE ANESTHETIST, CERTIFIED REGISTERED

## 2018-11-21 PROCEDURE — 63600175 PHARM REV CODE 636 W HCPCS: Performed by: NURSE ANESTHETIST, CERTIFIED REGISTERED

## 2018-11-21 PROCEDURE — 37000009 HC ANESTHESIA EA ADD 15 MINS: Performed by: COLON & RECTAL SURGERY

## 2018-11-21 PROCEDURE — 88305 TISSUE EXAM BY PATHOLOGIST: CPT | Performed by: PATHOLOGY

## 2018-11-21 PROCEDURE — 37000008 HC ANESTHESIA 1ST 15 MINUTES: Performed by: COLON & RECTAL SURGERY

## 2018-11-21 PROCEDURE — 45380 COLONOSCOPY AND BIOPSY: CPT | Performed by: COLON & RECTAL SURGERY

## 2018-11-21 PROCEDURE — E9220 PRA ENDO ANESTHESIA: HCPCS | Mod: 33,,, | Performed by: NURSE ANESTHETIST, CERTIFIED REGISTERED

## 2018-11-21 PROCEDURE — 45380 COLONOSCOPY AND BIOPSY: CPT | Mod: 33,,, | Performed by: COLON & RECTAL SURGERY

## 2018-11-21 PROCEDURE — 27201012 HC FORCEPS, HOT/COLD, DISP: Performed by: COLON & RECTAL SURGERY

## 2018-11-21 PROCEDURE — 88305 TISSUE EXAM BY PATHOLOGIST: CPT | Mod: 26,,, | Performed by: PATHOLOGY

## 2018-11-21 RX ORDER — SODIUM CHLORIDE, SODIUM LACTATE, POTASSIUM CHLORIDE, CALCIUM CHLORIDE 600; 310; 30; 20 MG/100ML; MG/100ML; MG/100ML; MG/100ML
INJECTION, SOLUTION INTRAVENOUS CONTINUOUS
Status: DISCONTINUED | OUTPATIENT
Start: 2018-11-21 | End: 2018-11-21 | Stop reason: HOSPADM

## 2018-11-21 RX ORDER — SODIUM CHLORIDE 9 MG/ML
INJECTION, SOLUTION INTRAVENOUS CONTINUOUS PRN
Status: DISCONTINUED | OUTPATIENT
Start: 2018-11-21 | End: 2018-11-21

## 2018-11-21 RX ORDER — PROPOFOL 10 MG/ML
VIAL (ML) INTRAVENOUS CONTINUOUS PRN
Status: DISCONTINUED | OUTPATIENT
Start: 2018-11-21 | End: 2018-11-21

## 2018-11-21 RX ORDER — PROPOFOL 10 MG/ML
VIAL (ML) INTRAVENOUS
Status: DISCONTINUED | OUTPATIENT
Start: 2018-11-21 | End: 2018-11-21

## 2018-11-21 RX ADMIN — PROPOFOL 125 MCG/KG/MIN: 10 INJECTION, EMULSION INTRAVENOUS at 10:11

## 2018-11-21 RX ADMIN — SODIUM CHLORIDE: 0.9 INJECTION, SOLUTION INTRAVENOUS at 10:11

## 2018-11-21 RX ADMIN — PROPOFOL 50 MG: 10 INJECTION, EMULSION INTRAVENOUS at 10:11

## 2018-11-21 NOTE — BRIEF OP NOTE
[facility]  Brief Operative Note     SUMMARY     Surgery Date: 11/21/2018     Surgeon(s) and Role:     * Brody August MD - Primary    Assisting Surgeon: None    Pre-op Diagnosis:  Colon cancer screening [Z12.11]    Post-op Diagnosis:  Post-Op Diagnosis Codes:     * Colon cancer screening [Z12.11]    Procedure(s) (LRB):  COLONOSCOPY (N/A)    Anesthesia: General    Description of the findings of the procedure: See Op Note    Findings/Key Components: See Op Note    Estimated Blood Loss: * No values recorded between 11/21/2018 12:00 AM and 11/21/2018 10:33 AM *         Specimens:   Specimen (12h ago, onward)    Start     Ordered    11/21/18 1024  Specimen to Pathology - Surgery  Once     Comments:  #ROUTINEJar 1. Cecum polyp     Start Status   11/21/18 1024 Collected (11/21/18 1034)       11/21/18 1034          Discharge Note    SUMMARY     Admit Date: 11/21/2018    Discharge Date and Time: 11/21/2018 10:37 AM    Hospital Course Patient was seen in the preoperative area by both myself and anesthesia. All consents were verified and all questions appropriately answered. All risks, benefits and alternatives explained to patient. Patient proceeded to endoscopy suite for colonoscopy and was discharged home postoperative once cleared by anesthesia.    Final Diagnosis: Post-Op Diagnosis Codes:     * Colon cancer screening [Z12.11]    Disposition: Home or Self Care    Follow Up/Patient Instructions: See Provation report    Medications:  Reconciled Home Medications:      Medication List      CONTINUE taking these medications    atenolol-chlorthalidone 50-25 mg Tab  Commonly known as:  TENORETIC  Take 1 tablet by mouth once daily.     ergocalciferol 50,000 unit Cap  Commonly known as:  VITAMIN D2  Take 1 capsule (50,000 Units total) by mouth every 7 days.     levocetirizine 5 MG tablet  Commonly known as:  XYZAL  Take 1 tablet (5 mg total) by mouth daily as needed for Allergies.     travoprost 0.004 %  ophthalmic solution  Commonly known as:  TRAVATAN Z  Place 1 drop into both eyes every evening.          Discharge Procedure Orders   Diet general     Call MD for:  temperature >100.4     Call MD for:  persistent nausea and vomiting     Call MD for:  severe uncontrolled pain     Call MD for:  difficulty breathing, headache or visual disturbances     Call MD for:  redness, tenderness, or signs of infection (pain, swelling, redness, odor or green/yellow discharge around incision site)     Call MD for:  hives     Call MD for:  persistent dizziness or light-headedness     Call MD for:  extreme fatigue     Activity as tolerated     Follow-up Information     Shaq Sheppard MD.    Specialty:  Hospitalist  Why:  As needed  Contact information:  [address] [facility] [id number] [phone]

## 2018-11-21 NOTE — H&P
[facility]  Colon and Rectal Surgery  History & Physical    Patient Name: Razia Keyes  MRN: [id number]  Admission Date: 11/21/2018  Attending Physician: Brody August MD  Primary Care Provider: Shaq Sheppard MD    Patient information was obtained from patient and medical records.    Subjective:     Chief Complaint/Reason for Admission: Here for Colonoscopy    History of Present Illness:  Patient is a 63 y.o. female presents for colonoscopy. Reports last colonoscopy >10yrs ago. No BRBPR, hematochezia, melena, or change in bowel habits. No personal or fam hx of CRC, polyps or IBD.    No current facility-administered medications on file prior to encounter.      Current Outpatient Medications on File Prior to Encounter   Medication Sig    atenolol-chlorthalidone (TENORETIC) 50-25 mg Tab Take 1 tablet by mouth once daily.    travoprost (TRAVATAN Z) 0.004 % ophthalmic solution Place 1 drop into both eyes every evening.    ergocalciferol (VITAMIN D2) 50,000 unit Cap Take 1 capsule (50,000 Units total) by mouth every 7 days.    levocetirizine (XYZAL) 5 MG tablet Take 1 tablet (5 mg total) by mouth daily as needed for Allergies.       Review of patient's allergies indicates:  No Known Allergies    Past Medical History:   Diagnosis Date    Essential hypertension 11/27/2012    Mixed hyperlipidemia 2/3/2015    Primary open angle glaucoma (POAG) 10/17/2018    Seasonal allergic rhinitis due to pollen 10/17/2018     Past Surgical History:   Procedure Laterality Date    COLONOSCOPY  2017    Normal 2007. She should return in 10 years    HYSTERECTOMY      Age 27     Family History     Problem Relation (Age of Onset)    Anesthesia problems Maternal Uncle    Dementia Mother    Diabetes Mother    Lymphoma Father    Sleep apnea Brother        Tobacco Use    Smoking status: Never Smoker    Smokeless tobacco: Never Used    Tobacco comment: The patient works as a schoolteacher and has been under a great  deal of stress both at school and at home. She exercises by walking about at school and in the stairways.   Substance and Sexual Activity    Alcohol use: Yes     Binge frequency: Less than monthly     Comment: Social    Drug use: No    Sexual activity: Not Currently     Partners: Male     Review of Systems   Constitutional: Negative for activity change, appetite change, chills, fatigue, fever and unexpected weight change.   HENT: Negative for congestion, ear pain, sore throat and trouble swallowing.    Eyes: Negative for pain, redness and itching.   Respiratory: Negative for cough, shortness of breath and wheezing.    Cardiovascular: Negative for chest pain, palpitations and leg swelling.   Gastrointestinal: Negative for abdominal distention, abdominal pain, anal bleeding, blood in stool, constipation, diarrhea, nausea, rectal pain and vomiting.   Endocrine: Negative for cold intolerance, heat intolerance and polyuria.   Genitourinary: Negative for dysuria, flank pain, frequency and hematuria.   Musculoskeletal: Negative for gait problem, joint swelling and neck pain.   Skin: Negative for color change, rash and wound.   Allergic/Immunologic: Negative for environmental allergies and immunocompromised state.   Neurological: Negative for dizziness, speech difficulty, weakness and numbness.   Psychiatric/Behavioral: Negative for agitation, confusion and hallucinations.     Objective:     Vital Signs (Most Recent):  Temp: 97.9 °F (36.6 °C) (11/21/18 0913)  Pulse: 95 (11/21/18 0913)  Resp: 18 (11/21/18 0913)  BP: (!) 145/74 (11/21/18 0913)  SpO2: 100 % (11/21/18 0913) Vital Signs (24h Range):  Temp:  [97.9 °F (36.6 °C)] 97.9 °F (36.6 °C)  Pulse:  [95] 95  Resp:  [18] 18  SpO2:  [100 %] 100 %  BP: (145)/(74) 145/74     Weight: 80.3 kg (177 lb)  Body mass index is 31.35 kg/m².    Physical Exam   Constitutional: She is oriented to person, place, and time. She appears well-developed.   HENT:   Head: Normocephalic and  atraumatic.   Eyes: Conjunctivae and EOM are normal.   Neck: Normal range of motion. No thyromegaly present.   Cardiovascular: Normal rate and regular rhythm.   Pulmonary/Chest: Effort normal. No respiratory distress.   Abdominal: Soft. She exhibits no distension and no mass. There is no tenderness.   Musculoskeletal: Normal range of motion. She exhibits no edema or tenderness.   Neurological: She is alert and oriented to person, place, and time.   Skin: Skin is warm and dry. Capillary refill takes less than 2 seconds. No rash noted.   Psychiatric: She has a normal mood and affect.         Assessment/Plan:     Patient is a 63 y.o. female who presents for colonoscopy     - Ok to proceed to endoscopy suite for colonoscopy  - Consent obtained. All risks, benefits and alternatives fully explained to patient, including but not limited to bleeding, infection, perforation, and missed polyps. All questions appropriately answered to patient's satisfaction. Consent signed and placed on chart.    Active Diagnoses:    Diagnosis Date Noted POA    Screening for colon cancer [Z12.11] 11/21/2018 Not Applicable      Problems Resolved During this Admission:     VTE Risk Mitigation (From admission, onward)    None          Brody August MD  Colon and Rectal Surgery  [facility]

## 2018-11-21 NOTE — ANESTHESIA POSTPROCEDURE EVALUATION
"Anesthesia Post Evaluation    Patient: Razia Keyes    Procedure(s) Performed: Procedure(s) (LRB):  COLONOSCOPY (N/A)    Final Anesthesia Type: general  Patient location during evaluation: PACU  Patient participation: Yes- Able to Participate  Level of consciousness: awake and alert  Post-procedure vital signs: reviewed and stable  Pain management: adequate  Airway patency: patent  PONV status at discharge: No PONV  Anesthetic complications: no      Cardiovascular status: blood pressure returned to baseline  Respiratory status: spontaneous ventilation and room air  Hydration status: euvolemic  Follow-up not needed.        Visit Vitals  BP (!) 148/71   Pulse 72   Temp 36.4 °C (97.5 °F)   Resp 18   Ht 5' 3" (1.6 m)   Wt 80.3 kg (177 lb)   SpO2 100%   Breastfeeding? No   BMI 31.35 kg/m²       Pain/Jacque Score: Pain Assessment Performed: Yes (11/21/2018 11:10 AM)  Presence of Pain: denies (11/21/2018 11:10 AM)  Jacque Score: 10 (11/21/2018 11:10 AM)        "

## 2018-11-21 NOTE — ANESTHESIA PREPROCEDURE EVALUATION
11/21/2018  Razia Keyes is a 63 y.o., female.    Past Medical History:   Diagnosis Date    Essential hypertension 11/27/2012    Mixed hyperlipidemia 2/3/2015    Primary open angle glaucoma (POAG) 10/17/2018    Seasonal allergic rhinitis due to pollen 10/17/2018       Anesthesia Evaluation    I have reviewed the Patient Summary Reports.     I have reviewed the Medications.     Review of Systems  Anesthesia Hx:  Denies Family Hx of Anesthesia complications.   Denies Personal Hx of Anesthesia complications.   Cardiovascular:   Hypertension        Physical Exam  General:  Well nourished    Airway/Jaw/Neck:  Airway Findings: Mouth Opening: Normal Tongue: Normal  General Airway Assessment: Adult  Mallampati: II  TM Distance: Normal, at least 6 cm       Chest/Lungs:  Chest/Lungs Findings: Normal Respiratory Rate     Heart/Vascular:  Heart Findings: Rate: Normal  Sounds: Normal     Abdomen:  Abdomen Findings:  Normal     Musculoskeletal:  Musculoskeletal Findings:    Skin:  Skin Findings:     Mental Status:  Mental Status Findings:  Cooperative, Alert and Oriented         Anesthesia Plan  Type of Anesthesia, risks & benefits discussed:  Anesthesia Type:  general  Patient's Preference: general  Intra-op Monitoring Plan:   Intra-op Monitoring Plan Comments:   Post Op Pain Control Plan:   Post Op Pain Control Plan Comments:   Induction:   IV  Beta Blocker:  Patient is not currently on a Beta-Blocker (No further documentation required).       Informed Consent: Patient understands risks and agrees with Anesthesia plan.  Questions answered. Anesthesia consent signed with patient.  ASA Score: 2     Day of Surgery Review of History & Physical: I have interviewed and examined the patient. I have reviewed the patient's H&P dated:  There are no significant changes.          Ready For Surgery From Anesthesia Perspective.

## 2018-11-21 NOTE — TRANSFER OF CARE
"Anesthesia Transfer of Care Note    Patient: Razia Keyes    Procedure(s) Performed: Procedure(s) (LRB):  COLONOSCOPY (N/A)    Patient location: PACU    Anesthesia Type: general    Transport from OR: Transported from OR on room air with adequate spontaneous ventilation    Post pain: adequate analgesia    Post assessment: no apparent anesthetic complications and tolerated procedure well    Post vital signs: stable    Level of consciousness: awake, oriented and alert    Nausea/Vomiting: no nausea/vomiting    Complications: none    Transfer of care protocol was followed      Last vitals:   Visit Vitals  BP (!) 134/59   Pulse 77   Temp 36.4 °C (97.5 °F)   Resp 18   Ht 5' 3" (1.6 m)   Wt 80.3 kg (177 lb)   SpO2 (!) 93%   Breastfeeding? No   BMI 31.35 kg/m²     "

## 2018-11-21 NOTE — [employer] PATIENT INSTRUCTIONS
Discharge Summary/Instructions after an Endoscopic Procedure  Patient Name: Razia Keyes  Patient MRN: [id number]  Patient YOB: 1955 Wednesday, November 21, 2018  Brody August MD  RESTRICTIONS:  During your procedure today, you received medications for sedation.  These   medications may affect your judgment, balance and coordination.  Therefore,   for 24 hours, you have the following restrictions:   - DO NOT drive a car, operate machinery, make legal/financial decisions,   sign important papers or drink alcohol.    ACTIVITY:  Today: no heavy lifting, straining or running due to procedural   sedation/anesthesia.  The following day: return to full activity including work.  DIET:  Eat and drink normally unless instructed otherwise.     TREATMENT FOR COMMON SIDE EFFECTS:  - Mild abdominal pain, nausea, belching, bloating or excessive gas:  rest,   eat lightly and use a heating pad.  - Sore Throat: treat with throat lozenges and/or gargle with warm salt   water.  - Because air was used during the procedure, expelling large amounts of air   from your rectum or belching is normal.  - If a bowel prep was taken, you may not have a bowel movement for 1-3 days.    This is normal.  SYMPTOMS TO WATCH FOR AND REPORT TO YOUR PHYSICIAN:  1. Abdominal pain or bloating, other than gas cramps.  2. Chest pain.  3. Back pain.  4. Signs of infection such as: chills or fever occurring within 24 hours   after the procedure.  5. Rectal bleeding, which would show as bright red, maroon, or black stools.   (A tablespoon of blood from the rectum is not serious, especially if   hemorrhoids are present.)  6. Vomiting.  7. Weakness or dizziness.  GO DIRECTLY TO THE NEAREST EMERGENCY ROOM IF YOU HAVE ANY OF THE FOLLOWING:      Difficulty breathing              Chills and/or fever over 101 F   Persistent vomiting and/or vomiting blood   Severe abdominal pain   Severe chest pain   Black, tarry stools   Bleeding- more than one  tablespoon   Any other symptom or condition that you feel may need urgent attention  Your doctor recommends these additional instructions:  If any biopsies were taken, your doctors clinic will contact you in 1 to 2   weeks with any results.  - Discharge patient to home.   - Resume previous diet.   - Continue present medications.   - Await pathology results.   - Repeat colonoscopy date to be determined after pending pathology results   are reviewed for surveillance based on pathology results.  For questions, problems or results please call your physician - Brody August MD at Work:  [phone].  [facility], EMERGENCY ROOM PHONE NUMBER: [phone]  IF A COMPLICATION OR EMERGENCY SITUATION ARISES AND YOU ARE UNABLE TO REACH   YOUR PHYSICIAN - GO DIRECTLY TO THE EMERGENCY ROOM.  MD Brody Dempsey MD  11/21/2018 10:36:00 AM  This report has been verified and signed electronically.  [employer]

## 2018-11-28 ENCOUNTER — TELEPHONE (OUTPATIENT)
Dept: ENDOSCOPY | Facility: HOSPITAL | Age: 63
End: 2018-11-28

## 2018-12-03 PROBLEM — Z12.11 SCREENING FOR COLON CANCER: Status: RESOLVED | Noted: 2018-11-21 | Resolved: 2018-12-03

## 2018-12-03 PROBLEM — D12.6 TUBULAR ADENOMA OF COLON: Status: ACTIVE | Noted: 2018-12-03

## 2018-12-11 ENCOUNTER — TELEPHONE (OUTPATIENT)
Dept: SLEEP MEDICINE | Facility: OTHER | Age: 63
End: 2018-12-11

## 2018-12-26 ENCOUNTER — TELEPHONE (OUTPATIENT)
Dept: SLEEP MEDICINE | Facility: OTHER | Age: 63
End: 2018-12-26

## 2019-01-18 ENCOUNTER — TELEPHONE (OUTPATIENT)
Dept: SLEEP MEDICINE | Facility: OTHER | Age: 64
End: 2019-01-18

## 2019-01-18 ENCOUNTER — TELEPHONE (OUTPATIENT)
Dept: SLEEP MEDICINE | Facility: CLINIC | Age: 64
End: 2019-01-18

## 2019-01-18 ENCOUNTER — PATIENT MESSAGE (OUTPATIENT)
Dept: SLEEP MEDICINE | Facility: CLINIC | Age: 64
End: 2019-01-18

## 2019-01-18 NOTE — TELEPHONE ENCOUNTER
lvm pt appointment has to be reschedule due her having her sleep study on the 1/21/2019 too soon to follow up. Pt needs to see Dr Ramírez after the sleep study

## 2019-01-21 ENCOUNTER — HOSPITAL ENCOUNTER (OUTPATIENT)
Dept: SLEEP MEDICINE | Facility: OTHER | Age: 64
Discharge: HOME | End: 2019-01-21
Attending: INTERNAL MEDICINE
Payer: COMMERCIAL

## 2019-01-21 DIAGNOSIS — G47.33 OSA (OBSTRUCTIVE SLEEP APNEA): ICD-10-CM

## 2019-01-21 DIAGNOSIS — G47.30 SLEEP APNEA, UNSPECIFIED TYPE: ICD-10-CM

## 2019-01-21 PROCEDURE — 95800 SLP STDY UNATTENDED: CPT

## 2019-01-23 ENCOUNTER — TELEPHONE (OUTPATIENT)
Dept: SLEEP MEDICINE | Facility: CLINIC | Age: 64
End: 2019-01-23

## 2019-01-23 PROCEDURE — 95806 PR SLEEP STUDY, UNATTENDED, SIMUL RECORD HR/O2 SAT/RESP FLOW/RESP EFFT: ICD-10-PCS | Mod: 26,,, | Performed by: INTERNAL MEDICINE

## 2019-01-23 PROCEDURE — 95806 SLEEP STUDY UNATT&RESP EFFT: CPT | Mod: 26,,, | Performed by: INTERNAL MEDICINE

## 2019-01-23 NOTE — TELEPHONE ENCOUNTER
LVM for pt to call the clinic back to schedule a appointment with Dr Ramírez to discuss sleep study results. A letter has been sent out aslo

## 2019-05-08 ENCOUNTER — PATIENT MESSAGE (OUTPATIENT)
Dept: SLEEP MEDICINE | Facility: CLINIC | Age: 64
End: 2019-05-08

## 2019-05-28 ENCOUNTER — TELEPHONE (OUTPATIENT)
Dept: INTERNAL MEDICINE | Facility: CLINIC | Age: 64
End: 2019-05-28

## 2019-05-28 DIAGNOSIS — Z12.39 SCREENING FOR BREAST CANCER: Primary | ICD-10-CM

## 2019-05-28 NOTE — TELEPHONE ENCOUNTER
Please advise     ----- Message from Josiah Edouard sent at 5/28/2019 12:12 PM CDT -----  Contact: Pt  Needs Advice    Reason for call:The Pt received her reminder letter to schedule her yearly Mammo.  Please send in a order/referral for the Pt so that we can schedule from it for the Pt.  After sending it over please contact the Pt so that she will know to call us back.        Communication Preference:[phone]    Additional Information:

## 2019-06-05 ENCOUNTER — OFFICE VISIT (OUTPATIENT)
Dept: SLEEP MEDICINE | Facility: CLINIC | Age: 64
End: 2019-06-05
Payer: COMMERCIAL

## 2019-06-05 ENCOUNTER — HOSPITAL ENCOUNTER (OUTPATIENT)
Dept: RADIOLOGY | Facility: HOSPITAL | Age: 64
Discharge: HOME | End: 2019-06-05
Attending: INTERNAL MEDICINE
Payer: COMMERCIAL

## 2019-06-05 VITALS
HEART RATE: 70 BPM | SYSTOLIC BLOOD PRESSURE: 117 MMHG | DIASTOLIC BLOOD PRESSURE: 75 MMHG | WEIGHT: 181.44 LBS | HEIGHT: 63 IN | BODY MASS INDEX: 32.15 KG/M2

## 2019-06-05 VITALS — WEIGHT: 177 LBS | BODY MASS INDEX: 31.35 KG/M2

## 2019-06-05 DIAGNOSIS — G47.33 OSA (OBSTRUCTIVE SLEEP APNEA): Primary | ICD-10-CM

## 2019-06-05 DIAGNOSIS — Z12.39 SCREENING FOR BREAST CANCER: ICD-10-CM

## 2019-06-05 PROCEDURE — 77067 SCR MAMMO BI INCL CAD: CPT | Mod: 26,,, | Performed by: RADIOLOGY

## 2019-06-05 PROCEDURE — 99214 PR OFFICE/OUTPT VISIT, EST, LEVL IV, 30-39 MIN: ICD-10-PCS | Mod: S$GLB,,, | Performed by: INTERNAL MEDICINE

## 2019-06-05 PROCEDURE — 99999 PR PBB SHADOW E&M-EST. PATIENT-LVL III: CPT | Mod: PBBFAC,,, | Performed by: INTERNAL MEDICINE

## 2019-06-05 PROCEDURE — 99999 PR PBB SHADOW E&M-EST. PATIENT-LVL III: ICD-10-PCS | Mod: PBBFAC,,, | Performed by: INTERNAL MEDICINE

## 2019-06-05 PROCEDURE — 77067 SCR MAMMO BI INCL CAD: CPT | Mod: TC

## 2019-06-05 PROCEDURE — 77067 MAMMO DIGITAL SCREENING BILAT WITH CAD: ICD-10-PCS | Mod: 26,,, | Performed by: RADIOLOGY

## 2019-06-05 PROCEDURE — 99214 OFFICE O/P EST MOD 30 MIN: CPT | Mod: S$GLB,,, | Performed by: INTERNAL MEDICINE

## 2019-06-05 NOTE — PROGRESS NOTES
2019    Pt is following in regards to the ZENAIDA underwent the HST showing AHI- 28 Mod ZENAIDA. She denies having dry mouth on waking up. She denies fatigue and tiredness. She denies morning headache. She denies nocturia. She does not drive. She feel the memory is good. She feels the mode is pleasant. She denies issue with concentration. She does snore and woken up gasping for in the past. She feels her insomnia is better.      EPWORTH SLEEPINESS SCALE 2019   Sitting and reading 0   Watching TV 3   Sitting, inactive in a public place (e.g. a theatre or a meeting) 0   As a passenger in a car for an hour without a break 3   Lying down to rest in the afternoon when circumstances permit 0   Sitting and talking to someone 0   Sitting quietly after a lunch without alcohol 1   In a car, while stopped for a few minutes in traffic 0   Total score 7         HST- 2019  Mod  ZENAIDA AHI- 28,RDI: 52, < 90 Spo2: 0.7%, Mean Spo2: 95.6 %, time snorin.5 %      2019    This 64 y.o. female patient presents for the evaluation of possible obstructive sleep apnea.    Referring Provider: No ref. provider found     Pt is referred for evaluation of ZENAIDA by PCP. .  Pt has been fatigued and tired on and off for the past 20 years . +snoring for the past 20 years, negative witnessed apneas, wakes 4 times unknown reason/nocturia, takes a few min's - half hrs to go  back to sleep, + tired on waking up some time, no caffeine intake, - urge to move legs but some times  leg kicking. + on and off nasal congestion. - cataplexy,-  hallucination,- sleep paralysis. - acting out dreams. - vivid dream.  Pt prefer to sleep on the side.     Pt denies having  head injury and hospitalization due to viral infection. - Sleep walking/talking. Memory is good. Pt feels content.  Denies any sleep related injury. Night mare rarely.Pt does not have  morning headache.  Pt doesn't  feel  Drowsy while driving. Pt is nose breather. Pt does not wake up to  eat at night. Pt doesn't sweat at night. Pt doesn't  grind teeth.  She lays in bed for long time and watching TV, watches clock when wakes up. She does ruminates and has hard time switching her mind off. She started to have difficulty in sleeping when she lost her mother. She was a night owl in the past and it started along with the insomnia. She sleep better when travelling.      PHQ9 6/4/2019   Little interest or pleasure in doing things: Not at all   Feeling down, depressed or hopeless: Not at all   Trouble falling asleep, staying asleep, or sleeping too much: More than half the days   Feeling tired or having little energy: Not at all   Poor appetite or overeating: Not at all   Feeling bad about yourself- or that you are a failure or have let yourself or family down Not at all   Trouble concentrating on things, such as reading the newspaper or watching television: Not at all   Moving or speaking so slowly that other people could have noticed. Or the opposite- being so fidgety or restless that you have been moving around a lot more than usual: Not at all   Thoughts that you would be better off dead or hurting yourself in some way: Not at all   If you indicated you have experienced any of the aforementioned problems, how difficult have these problems made it for you to do your work, take care of things at home or get along with other people? Not difficult at all   Total Score 2     BEATRIZ-7 Score: (P) 2  Interpretation: (P) Normal        EPWORTH SLEEPINESS SCALE 11/18/2018   Sitting and reading 1   Watching TV 2   Sitting, inactive in a public place (e.g. a theatre or a meeting) 0   As a passenger in a car for an hour without a break 3   Lying down to rest in the afternoon when circumstances permit 1   Sitting and talking to someone 0   Sitting quietly after a lunch without alcohol 2   In a car, while stopped for a few minutes in traffic 0   Total score 9     Sleep Clinic New Patient 11/18/2018   What time do you go  to bed on a week day? (Give a range) Between 10:00 pm-11:30pm   What time do you go to bed on a day off? (Give a range) 11:00 pm to midnight   How long does it take you to fall asleep? (Give a range) Sometimes an hour or an 1 hr. 1/2   On average, how many times per night do you wake up? 4 times per night   How long does it take you to fall back into sleep? (Give a range) 1//2 hr.   What time do you wake up to start your day on a week day? (Give a range) 5 am.   What time do you wake up to start your day on a day off? (Give a range) 8 or 9 am.   What time do you get out of bed? (Give a range) 10:00 am   On average, how many hours do you sleep? 5 hrs.   On average, how many naps do you take per day? When I'm off like now, 2 naps per day.   Rate your sleep quality from 0 to 5 (0-poor, 5-great). 4   Have you experienced:  Weight loss?   How much weight have you lost or gained (in lbs.) in the last year? Lost 2 pds.   On average, how many times per night do you go to the bathroom?  Once   Have you ever had a sleep study/CPAP machine/surgery for sleep apnea? No   Have you ever had a CPAP machine for sleep apnea? No   Have you ever had surgery for sleep apnea? No     Sleep Clinic ROS  11/18/2018   Difficulty breathing through the nose?  No   Sore throat or dry mouth in the morning? No   Irregular or very fast heart beat?  No   Shortness of breath?  No   Acid reflux? No   Body aches and pains?  No   Morning headaches? No   Dizziness? No   Mood changes?  No   Do you exercise?  Sometimes   Do you feel like moving your legs a lot?  No       SLEEP ROUTINE:  Bed partner: alone  Daytime naps: on weekends 1 hr and refreshing.     Past Medical History:   Diagnosis Date    Essential hypertension 11/27/2012    Mixed hyperlipidemia 2/3/2015    Primary open angle glaucoma (POAG) 10/17/2018    Seasonal allergic rhinitis due to pollen 10/17/2018    Tubular adenoma of colon (cecum) 12/3/2018    Last C-scope .  Next in 11-  "2023       Past Surgical History:   Procedure Laterality Date    COLONOSCOPY  2017    Normal 2007. 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 She exercises by walking about at school and in the stairways.   Substance Use Topics    Alcohol use: Yes     Binge frequency: Less than monthly     Comment: Social    Drug use: No       ALLERGIES: Reviewed in EPIC    CURRENT MEDICATIONS: Reviewed in EPIC    REVIEW OF SYSTEMS:  Sleep related symptoms as per HPI;        PHYSICAL EXAM:  /75   Pulse 70   Ht 5' 3" (1.6 m)   Wt 82.3 kg (181 lb 7 oz)   BMI 32.14 kg/m²   GENERAL: Well groomed; Normally developed;  Physical Exam  Neck size: 17.3 inch  Oral: velarde IV, high arch, mild over bite.  Nose: mild nasal congestion b/l  CVS: S1+S2 ,negative murmur/ gallop, regularly regular  Lungs: CTA B/L  Abdomen: BS+, no guarding, - rigidity.  Ext: negative pedal edema B/L LE         ASSESSMENT:    1. Sleep Apnea,. The patient symptomatically has snoring with exam findings of a crowded oral airway with medical co-morbidities of hypertension and obesity.underwent the HST showing AHI- 28 Mod ZENAIDA. This warrants treatment  for obstructive sleep apnea.    ZENAIDA (obstructive sleep apnea)  -     CPAP FOR HOME USE             PLAN:  ZENAIDA:  PAP therapy: Discussed the result of the HST and discussed the treatment options started on Auto CPAP 5- 20 cm H20, referred to the [facility]  for the formal mask fitting and advised avoid " drowsy driving. . Suggested the importance of compliance > 4 hrs  and also 90 days follow up. Informed about the opportunity to change mask  With in first 30 days . Suggested to avoid supine sleep and to loose weight and to have good sleep hygiene. Suggested to continue on  using nasal saline every day one - two squirt each nostril if that does not work  Use Flonase 1-2 sprays in each nostril at bedtime to help with nasal congestion. You need to use every day for at least a month to determine if it will be beneficial for you. Watch for nose bleeds. If this occurs, stop the medication and call the office.      Education: During our discussion today, we talked about the etiology of obstructive sleep apnea as well as the potential ramifications of untreated sleep apnea, which could include daytime sleepiness, hypertension, heart disease and/or stroke.  We discussed potential treatment options, which could include weight loss, body positioning, use of an oral appliance, continuous positive airway pressure (CPAP), EPAP, or referral for surgical consideration.    Precautions: The patient was advised to abstain from driving should they feel sleepy or drowsy.        Follow up: 3 months         Maria G Ramírez MD, FACP  Phone: [phone]  Fax: [phone]

## 2019-06-05 NOTE — PATIENT INSTRUCTIONS
Sleep Hygiene Practices    1. Try going to bed only when you are drowsy.    ?    2. If you are unable to fall asleep or stay asleep, leave your bedroom and engage in a quiet activity elsewhere. Do not permit yourself to fall asleep outside the bedroom. Return to bed when and only when you are sleepy. Repeat this process as often as necessary throughout night.    3. Maintain regular wake-up time, even on days off work & weekends    4. Use your bedroom for sleep and sex    5. Avoid napping during the daytime. If daytime sleepiness becomes overwhelming, limit nap time to a single nap of less than 1hr, no later than 3pm.    6. Distract your mind. Avoid clock watching. Lying in bed unable to sleep and frustrated needs to be avoided. Try reading or watching a videotape or listening to books on tape. It may be necessary to go into another room to do these.    7. Avoid caffeine within 4-6hrs of bedtime    8. Avoid use of nicotine close to bedtime    9. do not drink alcoholic beverages within 4-6hrs of bedtime    10. While a light snack before bedtime can help promote sound sleep, avoid large meals.    11. Obtain regular exercise, but avoid strenuous exercise within 4hrs of bedtime    12. Minimize light, noise, and extremes in temperature in the bedroom.    13. Precautions: The patient was advised to abstain from driving should they feel sleepy or drowsy.  ?    * This information is provided had been directly obtained from the [employer] wellness booklet on sleep hygiene. ([facility], [address] [place] [address]

## 2019-06-06 ENCOUNTER — HOSPITAL ENCOUNTER (OUTPATIENT)
Dept: RADIOLOGY | Facility: HOSPITAL | Age: 64
Discharge: HOME | End: 2019-06-06
Attending: INTERNAL MEDICINE
Payer: COMMERCIAL

## 2019-06-06 ENCOUNTER — TELEPHONE (OUTPATIENT)
Dept: RADIOLOGY | Facility: HOSPITAL | Age: 64
End: 2019-06-06

## 2019-06-06 DIAGNOSIS — R92.8 ABNORMAL MAMMOGRAM: ICD-10-CM

## 2019-06-06 PROCEDURE — 76642 ULTRASOUND BREAST LIMITED: CPT | Mod: TC,PO,LT

## 2019-06-06 PROCEDURE — 76642 ULTRASOUND BREAST LIMITED: CPT | Mod: 26,LT,, | Performed by: RADIOLOGY

## 2019-06-06 PROCEDURE — 76642 US BREAST LEFT LIMITED: ICD-10-PCS | Mod: 26,LT,, | Performed by: RADIOLOGY

## 2019-06-06 NOTE — TELEPHONE ENCOUNTER
Spoke with patient and explained mammogram findings.Patient expressed understanding of results. Patient scheduled abnormal mammogram follow up appointment at [facility] on 6/6/2019

## 2019-07-05 DIAGNOSIS — G47.33 OSA (OBSTRUCTIVE SLEEP APNEA): Primary | ICD-10-CM

## 2019-07-06 ENCOUNTER — HOSPITAL ENCOUNTER (EMERGENCY)
Facility: OTHER | Age: 64
Discharge: HOME | End: 2019-07-06
Attending: EMERGENCY MEDICINE
Payer: COMMERCIAL

## 2019-07-06 VITALS
HEIGHT: 63 IN | HEART RATE: 89 BPM | BODY MASS INDEX: 31.89 KG/M2 | DIASTOLIC BLOOD PRESSURE: 72 MMHG | RESPIRATION RATE: 16 BRPM | TEMPERATURE: 99 F | WEIGHT: 180 LBS | OXYGEN SATURATION: 96 % | SYSTOLIC BLOOD PRESSURE: 159 MMHG

## 2019-07-06 DIAGNOSIS — F41.9 ANXIETY: ICD-10-CM

## 2019-07-06 DIAGNOSIS — Z00.00 NORMAL PHYSICAL EXAM: Primary | ICD-10-CM

## 2019-07-06 PROCEDURE — 99283 EMERGENCY DEPT VISIT LOW MDM: CPT

## 2019-07-06 PROCEDURE — 93005 ELECTROCARDIOGRAM TRACING: CPT

## 2019-07-06 PROCEDURE — 93010 EKG 12-LEAD: ICD-10-PCS | Mod: ,,, | Performed by: INTERNAL MEDICINE

## 2019-07-06 PROCEDURE — 93010 ELECTROCARDIOGRAM REPORT: CPT | Mod: ,,, | Performed by: INTERNAL MEDICINE

## 2019-07-06 NOTE — ED PROVIDER NOTES
"Encounter Date: 7/6/2019    SCRIBE #1 NOTE: I, Viridiana Chicas, am scribing for, and in the presence of, Dr. Syed.       History     Chief Complaint   Patient presents with    Hypertension     Pt reporting with elevated BP reading today at home, reporting systolic was 138, denies symptoms of HA, dizziness, blurred vision. Reporting she started using CPAP in PM 6/3, reporting SOB and decrease in appetite since using cpap.        Time seen by provider: 6:51 PM on 07/06/2019    The patient is a 64 y.o. female who presents with concern that her blood pressure is fluctuating. Her systolic pressure at home was 138. Patient states she got her CPAP 06/03 and since has had increasing anxiety, feeling intermittently short of breath off CPAP. Yesterday, she had the mask changed given these complaints. She reports decreased appetite since using her CPAP. She believes her anxiety is contributing to this. She states she is scared, because "if I can't breathe, I will die". She denies SI or HI. No fever, chest pain or any other symptoms at this time. PMHx of HTN. PSHx of hysterectomy and colonoscopy.     The history is provided by the patient.     Review of patient's allergies indicates:  No Known Allergies  Past Medical History:   Diagnosis Date    Essential hypertension 11/27/2012    Mixed hyperlipidemia 2/3/2015    Primary open angle glaucoma (POAG) 10/17/2018    Seasonal allergic rhinitis due to pollen 10/17/2018    Tubular adenoma of colon (cecum) 12/3/2018    Last C-scope .  Next in 11- 2023     Past Surgical History:   Procedure Laterality Date    COLONOSCOPY  2017    Normal 2007. 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 She exercises by walking about at school and in the stairways.   Substance Use Topics    Alcohol use: Yes     Binge frequency: Less than monthly     Comment: Social    Drug use: No     Review of Systems   Constitutional: Positive for appetite change. Negative for fever.   HENT: Negative for sore throat.    Respiratory: Positive for chest tightness and shortness of breath.    Cardiovascular: Negative for chest pain.   Gastrointestinal: Negative for nausea.   Genitourinary: Negative for dysuria.   Musculoskeletal: Negative for back pain.   Skin: Negative for rash.   Neurological: Negative for weakness.   Hematological: Does not bruise/bleed easily.   Psychiatric/Behavioral: The patient is nervous/anxious.        Physical Exam     Initial Vitals [07/06/19 1801]   BP Pulse Resp Temp SpO2   136/89 76 18 98 °F (36.7 °C) 99 %      MAP       --         Physical Exam    Nursing note and vitals reviewed.  Constitutional: She appears well-developed and well-nourished. She is cooperative.  Non-toxic appearance. No distress.   HENT:   Head: Normocephalic and atraumatic.   Mouth/Throat: Oropharynx is clear and moist.   Eyes: Conjunctivae and EOM are normal. Pupils are equal, round, and reactive to light.   Neck: Normal range of motion and full passive range of motion without pain. Neck supple. No thyromegaly present.   Cardiovascular: Normal rate, regular rhythm, normal heart sounds and normal pulses.   Pulmonary/Chest: Effort normal and breath sounds normal. No respiratory distress.   Abdominal: Soft. Normal appearance and bowel sounds are normal. She exhibits no distension. There is no tenderness.   Musculoskeletal: Normal range of motion.   Neurological: She is alert and oriented to person, place,  "and time. She has normal strength. No cranial nerve deficit or sensory deficit.   Skin: Skin is warm, dry and intact. No rash noted.   Psychiatric: Her speech is normal and behavior is normal. Judgment and thought content normal.   Tearful.         ED Course   Procedures  Labs Reviewed - No data to display  EKG Readings: (Independently Interpreted)   Sinus arrhythmia at 63 bpm. Narrow QRS. Normal axis. No STEMI.       Imaging Results    None          Medical Decision Making:   History:   Old Medical Records: I decided to obtain old medical records.  Initial Assessment:     Urgent evaluation a 64-year-old female here with several complaints including concern over elevated blood pressure, and decreased appetite in the last month which she attributes to the using a new CPAP machine.  Further discussion with the patient, appears that she is likely experiencing some element of depression, she became tearful on further questioning.  Reports fear of "dying suddenly", and often does not share her feelings with friends given fear of being burdensome.  On exam patient is in no respiratory distress, speaking full sentences without conversational dyspnea, no hypoxia or tachycardia. VS reassuring.  Patient denies suicidal thoughts at this time, and we had a lengthy discussion regarding need for return to her therapist and encouraged discussion with family and friends, exercise and routine involvement in hobbies.  Independently Interpreted Test(s):   I have ordered and independently interpreted EKG Reading(s) - see prior notes  Clinical Tests:   Medical Tests: Ordered and Reviewed            Scribe Attestation:   Scribe #1: I performed the above scribed service and the documentation accurately describes the services I performed. I attest to the accuracy of the note.    Attending Attestation:           Physician Attestation for Scribe:  Physician Attestation Statement for Scribe #1: I, Dr. Syed, reviewed documentation, as " scribed by Viridiana Chicas in my presence, and it is both accurate and complete.                    Clinical Impression:     1. Normal physical exam    2. Anxiety            Disposition:   Disposition: Discharged  Condition: Stable                        Denae Syed MD  07/06/19 1958

## 2019-07-06 NOTE — ED TRIAGE NOTES
Pt presents to ER c/o high blood pressure.  Pt states she took her blood pressure around 1700 and BP was 138/94.  Pt states since she was placed on a CPAP machine in June her blood pressure has been up and down.  Pt also c/o episodes of sob since using the CPAP machine.  Pt denies chest pain, n/v/diarrhea.

## 2019-07-25 ENCOUNTER — TELEPHONE (OUTPATIENT)
Dept: SLEEP MEDICINE | Facility: CLINIC | Age: 64
End: 2019-07-25

## 2019-07-25 NOTE — TELEPHONE ENCOUNTER
----- Message from Dane Cisse sent at 7/25/2019  2:44 PM CDT -----  Contact: Pt   Name of Who is Calling: AJ BASSETT [[id number]]    What is the request in detail:Pt is requesting a call back regarding getting schedule for a f/u cpap appt .....  Please contact to further discuss and advise      Can the clinic reply by [facility]: No     [facility]  [phone]

## 2019-08-02 ENCOUNTER — PATIENT MESSAGE (OUTPATIENT)
Dept: SLEEP MEDICINE | Facility: CLINIC | Age: 64
End: 2019-08-02

## 2019-08-04 ENCOUNTER — PATIENT MESSAGE (OUTPATIENT)
Dept: ADMINISTRATIVE | Facility: OTHER | Age: 64
End: 2019-08-04

## 2019-08-06 ENCOUNTER — LAB VISIT (OUTPATIENT)
Dept: LAB | Facility: HOSPITAL | Age: 64
End: 2019-08-06
Attending: INTERNAL MEDICINE
Payer: COMMERCIAL

## 2019-08-06 ENCOUNTER — OFFICE VISIT (OUTPATIENT)
Dept: SLEEP MEDICINE | Facility: CLINIC | Age: 64
End: 2019-08-06
Payer: COMMERCIAL

## 2019-08-06 ENCOUNTER — OFFICE VISIT (OUTPATIENT)
Dept: INTERNAL MEDICINE | Facility: CLINIC | Age: 64
End: 2019-08-06
Payer: COMMERCIAL

## 2019-08-06 VITALS
SYSTOLIC BLOOD PRESSURE: 110 MMHG | HEIGHT: 63 IN | DIASTOLIC BLOOD PRESSURE: 72 MMHG | HEART RATE: 66 BPM | WEIGHT: 180.13 LBS | OXYGEN SATURATION: 97 % | BODY MASS INDEX: 31.92 KG/M2

## 2019-08-06 VITALS
HEIGHT: 63 IN | WEIGHT: 179.69 LBS | DIASTOLIC BLOOD PRESSURE: 67 MMHG | SYSTOLIC BLOOD PRESSURE: 115 MMHG | HEART RATE: 72 BPM | BODY MASS INDEX: 31.84 KG/M2

## 2019-08-06 DIAGNOSIS — E55.9 VITAMIN D DEFICIENCY: ICD-10-CM

## 2019-08-06 DIAGNOSIS — Z00.00 ANNUAL PHYSICAL EXAM: Primary | ICD-10-CM

## 2019-08-06 DIAGNOSIS — G47.33 OBSTRUCTIVE SLEEP APNEA ON CPAP: ICD-10-CM

## 2019-08-06 DIAGNOSIS — Z00.00 ANNUAL PHYSICAL EXAM: ICD-10-CM

## 2019-08-06 DIAGNOSIS — G47.33 OSA (OBSTRUCTIVE SLEEP APNEA): Primary | ICD-10-CM

## 2019-08-06 DIAGNOSIS — J30.1 SEASONAL ALLERGIC RHINITIS DUE TO POLLEN: Chronic | ICD-10-CM

## 2019-08-06 DIAGNOSIS — H40.1131 PRIMARY OPEN ANGLE GLAUCOMA (POAG) OF BOTH EYES, MILD STAGE: Chronic | ICD-10-CM

## 2019-08-06 DIAGNOSIS — Z28.82 VACCINE REFUSED BY PARENT: ICD-10-CM

## 2019-08-06 DIAGNOSIS — D12.6 TUBULAR ADENOMA OF COLON: ICD-10-CM

## 2019-08-06 DIAGNOSIS — I10 ESSENTIAL HYPERTENSION: Chronic | ICD-10-CM

## 2019-08-06 LAB
ALBUMIN SERPL BCP-MCNC: 3.7 G/DL (ref 3.5–5.2)
ALP SERPL-CCNC: 107 U/L (ref 55–135)
ALT SERPL W/O P-5'-P-CCNC: 12 U/L (ref 10–44)
ANION GAP SERPL CALC-SCNC: 9 MMOL/L (ref 8–16)
AST SERPL-CCNC: 85 U/L (ref 10–40)
BASOPHILS # BLD AUTO: 0.03 K/UL (ref 0–0.2)
BASOPHILS NFR BLD: 0.4 % (ref 0–1.9)
BILIRUB SERPL-MCNC: 0.3 MG/DL (ref 0.1–1)
BUN SERPL-MCNC: 14 MG/DL (ref 8–23)
CALCIUM SERPL-MCNC: 9.8 MG/DL (ref 8.7–10.5)
CHLORIDE SERPL-SCNC: 101 MMOL/L (ref 95–110)
CO2 SERPL-SCNC: 31 MMOL/L (ref 23–29)
CREAT SERPL-MCNC: 1.1 MG/DL (ref 0.5–1.4)
DIFFERENTIAL METHOD: ABNORMAL
EOSINOPHIL # BLD AUTO: 0.3 K/UL (ref 0–0.5)
EOSINOPHIL NFR BLD: 3.6 % (ref 0–8)
ERYTHROCYTE [DISTWIDTH] IN BLOOD BY AUTOMATED COUNT: 12.9 % (ref 11.5–14.5)
EST. GFR  (AFRICAN AMERICAN): >60 ML/MIN/1.73 M^2
EST. GFR  (NON AFRICAN AMERICAN): 53 ML/MIN/1.73 M^2
GLUCOSE SERPL-MCNC: 79 MG/DL (ref 70–110)
HCT VFR BLD AUTO: 39 % (ref 37–48.5)
HGB BLD-MCNC: 12.7 G/DL (ref 12–16)
LYMPHOCYTES # BLD AUTO: 3.2 K/UL (ref 1–4.8)
LYMPHOCYTES NFR BLD: 45.2 % (ref 18–48)
MCH RBC QN AUTO: 27.8 PG (ref 27–31)
MCHC RBC AUTO-ENTMCNC: 32.6 G/DL (ref 32–36)
MCV RBC AUTO: 85 FL (ref 82–98)
MONOCYTES # BLD AUTO: 0.9 K/UL (ref 0.3–1)
MONOCYTES NFR BLD: 11.9 % (ref 4–15)
NEUTROPHILS # BLD AUTO: 2.8 K/UL (ref 1.8–7.7)
NEUTROPHILS NFR BLD: 38.9 % (ref 38–73)
PLATELET # BLD AUTO: 398 K/UL (ref 150–350)
PMV BLD AUTO: 9.8 FL (ref 9.2–12.9)
POTASSIUM SERPL-SCNC: 3.2 MMOL/L (ref 3.5–5.1)
PROT SERPL-MCNC: 7.7 G/DL (ref 6–8.4)
RBC # BLD AUTO: 4.57 M/UL (ref 4–5.4)
SODIUM SERPL-SCNC: 141 MMOL/L (ref 136–145)
WBC # BLD AUTO: 7.14 K/UL (ref 3.9–12.7)

## 2019-08-06 PROCEDURE — 80053 COMPREHEN METABOLIC PANEL: CPT

## 2019-08-06 PROCEDURE — 99999 PR PBB SHADOW E&M-EST. PATIENT-LVL III: CPT | Mod: PBBFAC,,, | Performed by: INTERNAL MEDICINE

## 2019-08-06 PROCEDURE — 82306 VITAMIN D 25 HYDROXY: CPT

## 2019-08-06 PROCEDURE — 99999 PR PBB SHADOW E&M-EST. PATIENT-LVL III: CPT | Mod: PBBFAC,,, | Performed by: NURSE PRACTITIONER

## 2019-08-06 PROCEDURE — 99213 PR OFFICE/OUTPT VISIT, EST, LEVL III, 20-29 MIN: ICD-10-PCS | Mod: S$GLB,,, | Performed by: NURSE PRACTITIONER

## 2019-08-06 PROCEDURE — 99396 PREV VISIT EST AGE 40-64: CPT | Mod: S$GLB,,, | Performed by: INTERNAL MEDICINE

## 2019-08-06 PROCEDURE — 99396 PR PREVENTIVE VISIT,EST,40-64: ICD-10-PCS | Mod: S$GLB,,, | Performed by: INTERNAL MEDICINE

## 2019-08-06 PROCEDURE — 85025 COMPLETE CBC W/AUTO DIFF WBC: CPT

## 2019-08-06 PROCEDURE — 99999 PR PBB SHADOW E&M-EST. PATIENT-LVL III: ICD-10-PCS | Mod: PBBFAC,,, | Performed by: NURSE PRACTITIONER

## 2019-08-06 PROCEDURE — 36415 COLL VENOUS BLD VENIPUNCTURE: CPT

## 2019-08-06 PROCEDURE — 99213 OFFICE O/P EST LOW 20 MIN: CPT | Mod: S$GLB,,, | Performed by: NURSE PRACTITIONER

## 2019-08-06 PROCEDURE — 99999 PR PBB SHADOW E&M-EST. PATIENT-LVL III: ICD-10-PCS | Mod: PBBFAC,,, | Performed by: INTERNAL MEDICINE

## 2019-08-06 PROCEDURE — 84443 ASSAY THYROID STIM HORMONE: CPT

## 2019-08-06 RX ORDER — ERGOCALCIFEROL 1.25 MG/1
50000 CAPSULE ORAL
Qty: 13 CAPSULE | Refills: 4 | Status: SHIPPED | OUTPATIENT
Start: 2019-08-06 | End: 2019-12-26 | Stop reason: SDUPTHER

## 2019-08-06 RX ORDER — ATENOLOL AND CHLORTHALIDONE TABLET 50; 25 MG/1; MG/1
1 TABLET ORAL DAILY
Qty: 90 TABLET | Refills: 4 | Status: SHIPPED | OUTPATIENT
Start: 2019-08-06 | End: 2020-08-01 | Stop reason: SDUPTHER

## 2019-08-06 NOTE — PROGRESS NOTES
INTERNAL MEDICINE CLINIC  Follow-up Visit Progress Note     PRESENTING HISTORY     PCP: Shaq Sheppard MD    Last Clinic Visit with me:  10-    Current Chief Complaint/Problem:  No chief complaint on file.    History of Present Illness & ROS: Ms. Razia Keyes is a 64 y.o. female.    She met the Sleep Physician and adjusted the mask.    Review of Systems:  Answers for HPI/ROS submitted by the patient on 8/4/2019   activity change: No  unexpected weight change: No  neck pain: No  hearing loss: No  rhinorrhea: No  trouble swallowing: No  eye discharge: No  visual disturbance: No  chest tightness: No  wheezing: No  chest pain: No  palpitations: No  blood in stool: No  constipation: No  vomiting: No  diarrhea: No  polydipsia: No  polyuria: No  difficulty urinating: No  hematuria: No  menstrual problem: No  dysuria: No  joint swelling: No  arthralgias: No  headaches: No  weakness: No  confusion: No  dysphoric mood: No      PAST HISTORY:     Past Medical History:   Diagnosis Date    Elevated HDL 02/03/2015    Essential hypertension 11/27/2012    Obstructive sleep apnea on CPAP     Primary open angle glaucoma (POAG) 10/17/2018    Seasonal allergic rhinitis due to pollen 10/17/2018    Tubular adenoma of colon (cecum) 12/3/2018    Last C-scope .  Next in 11- 2023    Vaccine refused by parent 8/6/2019    Vitamin D deficiency 10/17/2018       Past Surgical History:   Procedure Laterality Date    COLONOSCOPY  2007    Normal 2007. She should return in 10 years    COLONOSCOPY N/A 11/21/2018    Performed by Brody August MD at [facility] (4TH FLR)    HYSTERECTOMY      Age 27       Family History   Problem Relation Age of Onset    Lymphoma Father     Diabetes Mother     Dementia Mother     Anesthesia problems Maternal Uncle     Sleep apnea Brother        Social History     Socioeconomic History    Marital status: Single     Spouse name: Not on file    Number of children: 0    Years of education:  Not on file    Highest education level: Not on file   Occupational History     Employer: [employer]   Social Needs    Financial resource strain: Not hard at all    Food insecurity:     Worry: Never true     Inability: Never true    Transportation needs:     Medical: No     Non-medical: No   Tobacco Use    Smoking status: Never Smoker    Smokeless tobacco: Never Used    Tobacco comment: 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 She exercises by walking about at school and in the stairways.   Substance and Sexual Activity    Alcohol use: Yes     Frequency: Monthly or less     Drinks per session: 1 or 2     Binge frequency: Never     Comment: Social    Drug use: No    Sexual activity: Not Currently     Partners: Male   Lifestyle    Physical activity:     Days per week: 2 days     Minutes per session: 40 min    Stress: Not on file   Relationships    Social connections:     Talks on phone: Three times a week     Gets together: Patient refused     Attends Yarsanism service: Not on file     Active member of club or organization: Yes     Attends meetings of clubs or organizations: 1 to 4 times per year     Relationship status: Never    Other Topics Concern    Not on file   Social History Narrative    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        MEDICATIONS & ALLERGIES:     Current Outpatient Medications on File Prior to Visit   Medication Sig Dispense Refill    atenolol-chlorthalidone (TENORETIC) 50-25 mg Tab Take 1 tablet by mouth once daily. 90 tablet 4    ergocalciferol (VITAMIN D2) 50,000 unit Cap Take 1 capsule (50,000 Units total) by mouth every 7 days. 13 capsule 4    levocetirizine (XYZAL) 5 MG tablet Take 1 tablet (5 mg total) by mouth daily as needed for Allergies. 30 tablet 11    travoprost (TRAVATAN Z) 0.004 % ophthalmic solution Place 1 drop into both eyes every evening. 2 Bottle 11     No current  facility-administered medications on file prior to visit.         Review of patient's allergies indicates:  No Known Allergies    Medications Reconciliation:   I have reconciled the patient's home medications and discharge medications with the patient/family. I have updated all changes.  Refer to After-Visit Medication List.    OBJECTIVE:     Vital Signs:  Vitals:    08/06/19 1533   BP: 110/72   Pulse: 66     Wt Readings from Last 1 Encounters:   08/06/19 1533 81.7 kg (180 lb 1.9 oz)     Body mass index is 31.91 kg/m².     Physical Exam:  General: Well developed, well nourished. No distress.  HEENT: Head is normocephalic, atraumatic; ears are normal.    Eyes: Clear conjunctiva.  Neck: Supple, symmetrical neck; trachea midline.  Lungs: Clear to auscultation bilaterally and normal respiratory effort.  Cardiovascular: Heart with regular rate and rhythm.    Extremities: No LE edema. Pulses 2+ and symmetric.   Abdomen: Abdomen is soft, non-tender non-distended with normal bowel sounds.  Skin: Skin color, texture, turgor normal. No rashes.  Musculoskeletal: Normal gait.   Lymph Nodes: No cervical or supraclavicular adenopathy.  Psychiatric: Normal affect. Alert.    Laboratory  Lab Results   Component Value Date    WBC 5.61 04/17/2018    HGB 13.1 04/17/2018    HCT 39.5 04/17/2018     (H) 04/17/2018    CHOL 221 (H) 04/17/2018    TRIG 75 04/17/2018    HDL 71 04/17/2018    ALT 11 04/17/2018    AST 35 04/17/2018     04/17/2018    K 3.8 04/17/2018     04/17/2018    CREATININE 1.1 04/17/2018    BUN 20 04/17/2018    CO2 29 04/17/2018    TSH 2.307 06/25/2012       ASSESSMENT & PLAN:     Annual physical exam  -     CBC auto differential; Future; Expected date: 08/06/2019  -     Comprehensive metabolic panel; Future; Expected date: 08/06/2019  -     Vitamin D; Future; Expected date: 02/02/2020  -     TSH; Future; Expected date: 08/06/2019    Essential hypertension  - Controlled. Refill:  -      atenolol-chlorthalidone (TENORETIC) 50-25 mg Tab; Take 1 tablet by mouth once daily.  Dispense: 90 tablet; Refill: 4    Seasonal allergic rhinitis due to pollen  - On PRN Xyzal.    Primary open angle glaucoma (POAG) of both eyes, mild stage   She is followed by outside eye doctor   On  travoprost (TRAVATAN Z) 0.004 % ophthalmic solution; Place 1 drop into both eyes every evening.     Vitamin D deficiency  - On Vitamin D2 50,000 weekly.    Plan:  -     Vitamin D; Future; Expected date: 02/02/2020  -     TSH; Future; Expected date: 08/06/2019  -     ergocalciferol (VITAMIN D2) 50,000 unit Cap; Take 1 capsule (50,000 Units total) by mouth every 7 days.  Dispense: 13 capsule; Refill: 4    Tubular adenoma of colon (cecum)  Last C-scope .  Next in 11- 2023    ZENAIDA (obstructive sleep apnea) - on CPAP.  - On CPAP doing well.    Vaccine refused by parent  - Patient does not want any vaccine.    Preventive Health Maintenance:  Refused Tdap, Shingle vaccine and Flu vaccine.        Eye: Dr. Mckenzie Rodriguez ([facility])     Return to Clinic for Follow Up with me:   1 Year.    Scheduled Follow-up :  Future Appointments   Date Time Provider Department Center   8/6/2019  4:00 PM LAB, APPOINTMENT [facility] LAB JEWELL MURCIAW       After Visit Medication List :     Medication List           Accurate as of 8/6/19  3:51 PM. If you have any questions, ask your nurse or doctor.               CONTINUE taking these medications    atenolol-chlorthalidone 50-25 mg Tab  Commonly known as:  TENORETIC  Take 1 tablet by mouth once daily.     ergocalciferol 50,000 unit Cap  Commonly known as:  VITAMIN D2  Take 1 capsule (50,000 Units total) by mouth every 7 days.     levocetirizine 5 MG tablet  Commonly known as:  XYZAL  Take 1 tablet (5 mg total) by mouth daily as needed for Allergies.     travoprost 0.004 % ophthalmic solution  Commonly known as:  TRAVATAN Z  Place 1 drop into both eyes every evening.           Where to Get  Your Medications      These medications were sent to [facility]/pharmacy #[id number] - [place], [place] - [address]  [address], [place] [address]    Phone:  [phone]   · atenolol-chlorthalidone 50-25 mg Tab  · ergocalciferol 50,000 unit Cap         Signing Physician:  Shaq Sheppard MD

## 2019-08-06 NOTE — PROGRESS NOTES
"Razia Keyes 64 y.o. year-old female returns for management of obstructive sleep apnea and CPAP equipment check. Last seen by Dr. Ramírez 06/05/2019. This is her initial visit with me.     Pt returns after set up of PAP machine on 06/26/2019 at [facility]. Pt sleep complaints of snoring and air gasping now resolved with PAP use. Waking up refreshed.  Reports oral drying with AmaraView FFM mask. Reports overt mask leaks. On demo, pt donning AmaraView FFM upside down and headgear incorrectly put together. Denies rainout.  Denies pressure intolerance or air hunger. Denies congestion. Denies aerophagia.     Dreamstation APAP 5 - 14 cm, 30 days, > 4 hours: 86.7%, Predicted AHI: 9.6, 90%tile pressure: 7.1 cm, Ave LL: 67.4%    Baseline Sleep Study: 01/21/2019  lb. AHI 28, RDI 52, O2 bassem 87.8%, % time < 90% SpO2: 0.7    Review of Systems: Sleep related symptoms as per HPI. Otherwise, a balance of 10 systems reviewed is negative.    Physical Exam:   /67   Pulse 72   Ht 5' 3" (1.6 m)   Wt 81.5 kg (179 lb 10.8 oz)   BMI 31.83 kg/m²   GENERAL: Well groomed    Assessment:     Obstructive sleep apnea, moderate by AHI, severe by RDI. The patient symptomatically has snoring and air gasping which improves with CPAP use. The patient is adherent on CPAP and experiencing symptomatic benefit despite mild residual AHI which is most likely from large leak.  Medical co-morbidities: HTN and obesity. This warrants continued treatment for ZENAIDA.     Plan:     Continue CPAP therapy at 5-14 cm H2O.   Live mask demo completed today. Pt able to repeat back demo of proper mask fit.     Education: During our discussion today, we talked about the etiology of obstructive sleep apnea as well as the potential ramifications of untreated sleep apnea, which could include daytime sleepiness, hypertension, heart disease and/or stroke. We discussed potential treatment options, which could include weight loss, body positioning, continuous positive " airway pressure (CPAP), or referral for surgical consideration.     Behavior modification which includes losing weight, exercising, changing the sleep position, abstaining from alcohol, and avoiding certain medications    Precautions: The patient was advised to abstain from driving should they feel sleepy or drowsy    RTC in 6 months, sooner if needed. Pending compliance/efficacy, consider annual visits thereafter.

## 2019-08-07 LAB
25(OH)D3+25(OH)D2 SERPL-MCNC: 39 NG/ML (ref 30–96)
TSH SERPL DL<=0.005 MIU/L-ACNC: 2.25 UIU/ML (ref 0.4–4)

## 2019-10-14 DIAGNOSIS — J30.1 SEASONAL ALLERGIC RHINITIS DUE TO POLLEN: Chronic | ICD-10-CM

## 2019-10-14 RX ORDER — LEVOCETIRIZINE DIHYDROCHLORIDE 5 MG/1
5 TABLET, FILM COATED ORAL DAILY PRN
Qty: 30 TABLET | Refills: 11 | Status: SHIPPED | OUTPATIENT
Start: 2019-10-14 | End: 2020-10-09 | Stop reason: SDUPTHER

## 2019-10-16 ENCOUNTER — TELEPHONE (OUTPATIENT)
Dept: SLEEP MEDICINE | Facility: CLINIC | Age: 64
End: 2019-10-16

## 2019-10-16 NOTE — TELEPHONE ENCOUNTER
----- Message from Jodi Encarnacion MA sent at 10/16/2019  4:18 PM CDT -----  Contact: Lalitha ([facility] )      ----- Message -----  From: Katie Holden  Sent: 10/16/2019   4:08 PM CDT  To: Darrell Shahid Staff    Name of Who is Calling: Lalitha ([facility] )      What is the request in detail: Needs script order for the pt face mask to be signed .Please call  To further discuss       Can the clinic reply by [facility]: N       What Number to [facility]: [phone]

## 2019-10-17 ENCOUNTER — TELEPHONE (OUTPATIENT)
Dept: SLEEP MEDICINE | Facility: CLINIC | Age: 64
End: 2019-10-17

## 2019-10-24 DIAGNOSIS — H40.1131 PRIMARY OPEN ANGLE GLAUCOMA (POAG) OF BOTH EYES, MILD STAGE: Chronic | ICD-10-CM

## 2019-10-24 RX ORDER — TRAVOPROST OPHTHALMIC SOLUTION 0.04 MG/ML
1 SOLUTION OPHTHALMIC NIGHTLY
Qty: 2 BOTTLE | Refills: 11 | Status: SHIPPED | OUTPATIENT
Start: 2019-10-24 | End: 2021-12-06

## 2020-01-06 ENCOUNTER — PATIENT MESSAGE (OUTPATIENT)
Dept: SLEEP MEDICINE | Facility: CLINIC | Age: 65
End: 2020-01-06

## 2020-01-09 ENCOUNTER — LAB VISIT (OUTPATIENT)
Dept: LAB | Facility: HOSPITAL | Age: 65
End: 2020-01-09
Attending: NURSE PRACTITIONER
Payer: COMMERCIAL

## 2020-01-09 ENCOUNTER — OFFICE VISIT (OUTPATIENT)
Dept: INTERNAL MEDICINE | Facility: CLINIC | Age: 65
End: 2020-01-09
Payer: COMMERCIAL

## 2020-01-09 VITALS
WEIGHT: 179.88 LBS | BODY MASS INDEX: 31.87 KG/M2 | DIASTOLIC BLOOD PRESSURE: 82 MMHG | SYSTOLIC BLOOD PRESSURE: 134 MMHG | HEART RATE: 68 BPM | HEIGHT: 63 IN | OXYGEN SATURATION: 98 %

## 2020-01-09 DIAGNOSIS — R82.90 URINE ABNORMALITY: ICD-10-CM

## 2020-01-09 DIAGNOSIS — L29.9 PRURITUS: ICD-10-CM

## 2020-01-09 DIAGNOSIS — L29.9 PRURITUS: Primary | ICD-10-CM

## 2020-01-09 LAB
ALBUMIN SERPL BCP-MCNC: 3.8 G/DL (ref 3.5–5.2)
ALP SERPL-CCNC: 106 U/L (ref 55–135)
ALT SERPL W/O P-5'-P-CCNC: 11 U/L (ref 10–44)
ANION GAP SERPL CALC-SCNC: 10 MMOL/L (ref 8–16)
AST SERPL-CCNC: 61 U/L (ref 10–40)
BASOPHILS # BLD AUTO: 0.03 K/UL (ref 0–0.2)
BASOPHILS NFR BLD: 0.5 % (ref 0–1.9)
BILIRUB SERPL-MCNC: 0.5 MG/DL (ref 0.1–1)
BILIRUB SERPL-MCNC: NORMAL MG/DL
BILIRUB UR QL STRIP: NEGATIVE
BLOOD URINE, POC: NORMAL
BUN SERPL-MCNC: 13 MG/DL (ref 8–23)
CALCIUM SERPL-MCNC: 10.4 MG/DL (ref 8.7–10.5)
CHLORIDE SERPL-SCNC: 101 MMOL/L (ref 95–110)
CLARITY UR REFRACT.AUTO: ABNORMAL
CO2 SERPL-SCNC: 31 MMOL/L (ref 23–29)
COLOR UR AUTO: YELLOW
COLOR, POC UA: YELLOW
CREAT SERPL-MCNC: 1.1 MG/DL (ref 0.5–1.4)
DIFFERENTIAL METHOD: ABNORMAL
EOSINOPHIL # BLD AUTO: 0.2 K/UL (ref 0–0.5)
EOSINOPHIL NFR BLD: 2.8 % (ref 0–8)
ERYTHROCYTE [DISTWIDTH] IN BLOOD BY AUTOMATED COUNT: 12.4 % (ref 11.5–14.5)
EST. GFR  (AFRICAN AMERICAN): >60 ML/MIN/1.73 M^2
EST. GFR  (NON AFRICAN AMERICAN): 53.2 ML/MIN/1.73 M^2
GLUCOSE SERPL-MCNC: 85 MG/DL (ref 70–110)
GLUCOSE UR QL STRIP: NEGATIVE
GLUCOSE UR QL STRIP: NORMAL
HCT VFR BLD AUTO: 40.8 % (ref 37–48.5)
HGB BLD-MCNC: 12.9 G/DL (ref 12–16)
HGB UR QL STRIP: NEGATIVE
KETONES UR QL STRIP: NEGATIVE
KETONES UR QL STRIP: NORMAL
LEUKOCYTE ESTERASE UR QL STRIP: NEGATIVE
LEUKOCYTE ESTERASE URINE, POC: NORMAL
LYMPHOCYTES # BLD AUTO: 3.2 K/UL (ref 1–4.8)
LYMPHOCYTES NFR BLD: 49.9 % (ref 18–48)
MCH RBC QN AUTO: 27.3 PG (ref 27–31)
MCHC RBC AUTO-ENTMCNC: 31.6 G/DL (ref 32–36)
MCV RBC AUTO: 86 FL (ref 82–98)
MONOCYTES # BLD AUTO: 0.6 K/UL (ref 0.3–1)
MONOCYTES NFR BLD: 8.8 % (ref 4–15)
NEUTROPHILS # BLD AUTO: 2.4 K/UL (ref 1.8–7.7)
NEUTROPHILS NFR BLD: 37.8 % (ref 38–73)
NITRITE UR QL STRIP: NEGATIVE
NITRITE, POC UA: NORMAL
NRBC BLD-RTO: 0 /100 WBC
PH UR STRIP: 5 [PH] (ref 5–8)
PH, POC UA: 5
PLATELET # BLD AUTO: 372 K/UL (ref 150–350)
PMV BLD AUTO: 9.6 FL (ref 9.2–12.9)
POTASSIUM SERPL-SCNC: 3.7 MMOL/L (ref 3.5–5.1)
PROT SERPL-MCNC: 8.1 G/DL (ref 6–8.4)
PROT UR QL STRIP: NEGATIVE
PROTEIN, POC: NORMAL
RBC # BLD AUTO: 4.72 M/UL (ref 4–5.4)
SODIUM SERPL-SCNC: 142 MMOL/L (ref 136–145)
SP GR UR STRIP: 1.02 (ref 1–1.03)
SPECIFIC GRAVITY, POC UA: 1.02
TSH SERPL DL<=0.005 MIU/L-ACNC: 2 UIU/ML (ref 0.4–4)
URN SPEC COLLECT METH UR: ABNORMAL
UROBILINOGEN, POC UA: NORMAL
WBC # BLD AUTO: 6.33 K/UL (ref 3.9–12.7)

## 2020-01-09 PROCEDURE — 84443 ASSAY THYROID STIM HORMONE: CPT

## 2020-01-09 PROCEDURE — 81003 URINALYSIS AUTO W/O SCOPE: CPT

## 2020-01-09 PROCEDURE — 99999 PR PBB SHADOW E&M-EST. PATIENT-LVL III: ICD-10-PCS | Mod: PBBFAC,,, | Performed by: NURSE PRACTITIONER

## 2020-01-09 PROCEDURE — 99999 PR PBB SHADOW E&M-EST. PATIENT-LVL III: CPT | Mod: PBBFAC,,, | Performed by: NURSE PRACTITIONER

## 2020-01-09 PROCEDURE — 99213 PR OFFICE/OUTPT VISIT, EST, LEVL III, 20-29 MIN: ICD-10-PCS | Mod: 25,S$GLB,, | Performed by: NURSE PRACTITIONER

## 2020-01-09 PROCEDURE — 81002 URINALYSIS NONAUTO W/O SCOPE: CPT | Mod: S$GLB,,, | Performed by: NURSE PRACTITIONER

## 2020-01-09 PROCEDURE — 3075F SYST BP GE 130 - 139MM HG: CPT | Mod: CPTII,S$GLB,, | Performed by: NURSE PRACTITIONER

## 2020-01-09 PROCEDURE — 36415 COLL VENOUS BLD VENIPUNCTURE: CPT

## 2020-01-09 PROCEDURE — 81002 POCT URINE DIPSTICK WITHOUT MICROSCOPE: ICD-10-PCS | Mod: S$GLB,,, | Performed by: NURSE PRACTITIONER

## 2020-01-09 PROCEDURE — 3079F PR MOST RECENT DIASTOLIC BLOOD PRESSURE 80-89 MM HG: ICD-10-PCS | Mod: CPTII,S$GLB,, | Performed by: NURSE PRACTITIONER

## 2020-01-09 PROCEDURE — 85025 COMPLETE CBC W/AUTO DIFF WBC: CPT

## 2020-01-09 PROCEDURE — 3079F DIAST BP 80-89 MM HG: CPT | Mod: CPTII,S$GLB,, | Performed by: NURSE PRACTITIONER

## 2020-01-09 PROCEDURE — 99213 OFFICE O/P EST LOW 20 MIN: CPT | Mod: 25,S$GLB,, | Performed by: NURSE PRACTITIONER

## 2020-01-09 PROCEDURE — 80053 COMPREHEN METABOLIC PANEL: CPT

## 2020-01-09 PROCEDURE — 3008F BODY MASS INDEX DOCD: CPT | Mod: CPTII,S$GLB,, | Performed by: NURSE PRACTITIONER

## 2020-01-09 PROCEDURE — 3075F PR MOST RECENT SYSTOLIC BLOOD PRESS GE 130-139MM HG: ICD-10-PCS | Mod: CPTII,S$GLB,, | Performed by: NURSE PRACTITIONER

## 2020-01-09 PROCEDURE — 3008F PR BODY MASS INDEX (BMI) DOCUMENTED: ICD-10-PCS | Mod: CPTII,S$GLB,, | Performed by: NURSE PRACTITIONER

## 2020-01-09 RX ORDER — TRIAMCINOLONE ACETONIDE 1 MG/G
CREAM TOPICAL 2 TIMES DAILY
Qty: 80 G | Refills: 1 | Status: SHIPPED | OUTPATIENT
Start: 2020-01-09 | End: 2020-08-01

## 2020-01-09 RX ORDER — HYDROXYZINE PAMOATE 25 MG/1
25 CAPSULE ORAL EVERY 6 HOURS PRN
Qty: 40 CAPSULE | Refills: 0 | Status: SHIPPED | OUTPATIENT
Start: 2020-01-09 | End: 2020-01-19

## 2020-01-09 NOTE — PATIENT INSTRUCTIONS
Hydroxyzine every 6 hours as needed for itching.  Can make you sleepy, so start taking at night and see how drowsy it makes you.  Do not drive while taking.  Do not take at the same time as the xyzal.     Kenalog cream as needed to most itchy areas.  Avoid use on face    Cerave anti-itch cream (can get this at the drug store)    Avoid hot showers    Keep skin well moisturized    Dove soap or non-fragrant soaps only    Avoid known allergy triggers    Call eye doctor to discuss if she/he thinks changes in eye drops could have caused this.    Call or return to clinic if symptoms worsen or do not improve with discussed treatment plan.     May go to ER for new shortness of breath, chest pain, lightheadedness, fever, severe/new headaches or any other emergent complaints or changes in condition.

## 2020-01-09 NOTE — PROGRESS NOTES
Subjective:      Patient ID: Razia Keyes is a 64 y.o. female.    Chief Complaint: No chief complaint on file.    Ms. Keyes is a 64 y.o. female patient of Shaq Sheppard MD who comes in today for body itching.  Going on for about one week now.  Is improving some.  She is itching everywhere except for feet.  No new foods, detergents, perfumes, body products.  Only new medications is that her eye doctor changed her from travatan (travaprost) to latanoprost for her glaucoma bc it was cheaper. And she is not taking systane for dry eye.   She does not feel that this correlates to the start of her itching, but her eyes do itch along with the rest of her body.  She denies rashes or dry skin.   Does typically have environmental allergies.  No fevers or chills.  Does have a hx of anemia.  [place]aves next [place] to go [place] of country.  No runny nose, no sore throat.  Does not take xyzal regularly or any antihistamine regularly for allergies.   Patient asked if urine could be checked as infection a possibility for itching.  No urinary symptoms.  No blood, no dysuria, no flank pain, no frequency      Review of Systems   Constitutional: Negative for activity change and unexpected weight change.   HENT: Negative for hearing loss, rhinorrhea and trouble swallowing.    Eyes: Positive for visual disturbance. Negative for discharge.        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    Cardiovascular: Negative for chest pain, palpitations and leg swelling.   Gastrointestinal: Negative for blood in stool, constipation, diarrhea and vomiting.   Endocrine: Negative for polydipsia and polyuria.   Genitourinary: Negative for difficulty urinating, dysuria, hematuria and menstrual problem.   Musculoskeletal: Negative for arthralgias, joint swelling and neck pain.   Skin: Negative for rash.        Generalized skin itching    Neurological: Negative for weakness and headaches.   Psychiatric/Behavioral: Negative for confusion and dysphoric mood.       Review of patient's allergies indicates:  No Known Allergies      Current Outpatient Medications:     atenolol-chlorthalidone (TENORETIC) 50-25 mg Tab, Take 1 tablet by mouth once daily., Disp: 90 tablet, Rfl: 4    ergocalciferol (VITAMIN D2) 50,000 unit Cap, Take 1 capsule (50,000 Units total) by mouth every 7 days., Disp: 13 capsule, Rfl: 4    levocetirizine (XYZAL) 5 MG tablet, Take 1 tablet (5 mg total) by mouth daily as needed for Allergies., Disp: 30 tablet, Rfl: 11    travoprost (TRAVATAN Z) 0.004 % ophthalmic solution, Place 1 drop into both eyes every evening., Disp: 2 Bottle, Rfl: 11    Patient Active Problem List    Diagnosis Date Noted    Vaccine refused by parent 08/06/2019    Obstructive sleep apnea on CPAP     Tubular adenoma of colon (cecum) 12/03/2018    Seasonal allergic rhinitis due to pollen 10/17/2018    Vitamin D deficiency 10/17/2018    Primary open angle glaucoma (POAG) 10/17/2018    Essential hypertension 11/27/2012       Past Medical History:   Diagnosis Date    Elevated HDL 02/03/2015    Essential hypertension 11/27/2012    Obstructive sleep apnea on CPAP     Primary open angle glaucoma (POAG) 10/17/2018    Seasonal allergic rhinitis due to pollen 10/17/2018    Tubular adenoma of colon (cecum) 12/3/2018    Last C-scope .  Next in 11- 2023    Vaccine refused by parent 8/6/2019    Vitamin D deficiency 10/17/2018       Past Surgical History:   Procedure Laterality Date    COLONOSCOPY  2007    Normal 2007. She should return in 10 years    COLONOSCOPY N/A 11/21/2018    Procedure: COLONOSCOPY;  Surgeon: Brody August MD;  Location: [facility] ([address] [place] [address]);  Service: Endoscopy;  Laterality: N/A;    HYSTERECTOMY      Age 27       Objective:     Vitals:    01/09/20 1632   BP: 134/82   Pulse: 68   SpO2: 98%   Weight: 81.6 kg (179 lb 14.3 oz)  "  Height: 5' 3" (1.6 m)   PainSc: 0-No pain       Body mass index is 31.87 kg/m².    Physical Exam   Constitutional: She is oriented to person, place, and time. She appears well-developed and well-nourished. No distress.   HENT:   Head: Normocephalic and atraumatic.   Eyes: Conjunctivae and EOM are normal. No scleral icterus.   Neck: Normal range of motion. Neck supple.   Cardiovascular: Normal rate, regular rhythm, normal heart sounds and intact distal pulses.   Pulmonary/Chest: Effort normal and breath sounds normal. No respiratory distress.   Abdominal: She exhibits no distension.   Musculoskeletal: Normal range of motion. She exhibits no edema.   Neurological: She is alert and oriented to person, place, and time.   Skin: Skin is warm and dry. No rash noted. She is not diaphoretic.   No rashes noted, no areas of dry skin noted   Psychiatric: She has a normal mood and affect. Her behavior is normal.     Assessment:     1. Pruritus    2. Urine abnormality      Plan:     Diagnoses and all orders for this visit:    Pruritus  -     POCT URINE DIPSTICK WITHOUT MICROSCOPE  -     triamcinolone acetonide 0.1% (KENALOG) 0.1 % cream; Apply topically 2 (two) times daily. for 10 days  -     hydrOXYzine pamoate (VISTARIL) 25 MG Cap; Take 1 capsule (25 mg total) by mouth every 6 (six) hours as needed (itching.).  -     Comprehensive metabolic panel; Future  -     CBC auto differential; Future  -     TSH; Future    See patient instructions    Urine abnormality  -     Urinalysis, Reflex to Urine Culture Urine, Clean Catch    Urine dipstick did show nitrates.  Given that patient not having symptoms will sen for a ua with reflex to culture before treating    Color, UA Yellow    Spec Grav UA 1.020    pH, UA 5    WBC, UA Neg    Nitrite, UA Pos    Protein Trace    Glucose, UA Normal    Ketones, UA Neg    Urobilinogen, UA Normal    Bilirubin Neg    Blood, UA Neg        Patient Instructions   Hydroxyzine every 6 hours as needed for " itching.  Can make you sleepy, so start taking at night and see how drowsy it makes you.  Do not drive while taking.  Do not take at the same time as the xyzal.     Kenalog cream as needed to most itchy areas.  Avoid use on face    Cerave anti-itch cream (can get this at the drug store)    Avoid hot showers    Keep skin well moisturized    Dove soap or non-fragrant soaps only    Avoid known allergy triggers    Call eye doctor to discuss if she/he thinks changes in eye drops could have caused this.    Call or return to clinic if symptoms worsen or do not improve with discussed treatment plan.     May go to ER for new shortness of breath, chest pain, lightheadedness, fever, severe/new headaches or any other emergent complaints or changes in condition.

## 2020-02-19 ENCOUNTER — PATIENT MESSAGE (OUTPATIENT)
Dept: SLEEP MEDICINE | Facility: CLINIC | Age: 65
End: 2020-02-19

## 2020-02-24 ENCOUNTER — PATIENT OUTREACH (OUTPATIENT)
Dept: ADMINISTRATIVE | Facility: OTHER | Age: 65
End: 2020-02-24

## 2020-02-24 NOTE — PROGRESS NOTES
Chart reviewed.   Immunizations: LINKS failed  Orders placed: n/a  Upcoming appts to satisfy MARV topics: n/a

## 2020-02-26 ENCOUNTER — OFFICE VISIT (OUTPATIENT)
Dept: SLEEP MEDICINE | Facility: CLINIC | Age: 65
End: 2020-02-26
Payer: COMMERCIAL

## 2020-02-26 VITALS
HEIGHT: 63 IN | BODY MASS INDEX: 32.03 KG/M2 | SYSTOLIC BLOOD PRESSURE: 116 MMHG | DIASTOLIC BLOOD PRESSURE: 71 MMHG | WEIGHT: 180.75 LBS | HEART RATE: 63 BPM

## 2020-02-26 DIAGNOSIS — E66.9 OBESITY (BMI 30.0-34.9): ICD-10-CM

## 2020-02-26 DIAGNOSIS — G47.33 OBSTRUCTIVE SLEEP APNEA: Primary | ICD-10-CM

## 2020-02-26 PROCEDURE — 1101F PR PT FALLS ASSESS DOC 0-1 FALLS W/OUT INJ PAST YR: ICD-10-PCS | Mod: CPTII,S$GLB,, | Performed by: NURSE PRACTITIONER

## 2020-02-26 PROCEDURE — 1101F PT FALLS ASSESS-DOCD LE1/YR: CPT | Mod: CPTII,S$GLB,, | Performed by: NURSE PRACTITIONER

## 2020-02-26 PROCEDURE — 99214 OFFICE O/P EST MOD 30 MIN: CPT | Mod: S$GLB,,, | Performed by: NURSE PRACTITIONER

## 2020-02-26 PROCEDURE — 99999 PR PBB SHADOW E&M-EST. PATIENT-LVL III: CPT | Mod: PBBFAC,,, | Performed by: NURSE PRACTITIONER

## 2020-02-26 PROCEDURE — 3078F PR MOST RECENT DIASTOLIC BLOOD PRESSURE < 80 MM HG: ICD-10-PCS | Mod: CPTII,S$GLB,, | Performed by: NURSE PRACTITIONER

## 2020-02-26 PROCEDURE — 3074F SYST BP LT 130 MM HG: CPT | Mod: CPTII,S$GLB,, | Performed by: NURSE PRACTITIONER

## 2020-02-26 PROCEDURE — 3078F DIAST BP <80 MM HG: CPT | Mod: CPTII,S$GLB,, | Performed by: NURSE PRACTITIONER

## 2020-02-26 PROCEDURE — 3074F PR MOST RECENT SYSTOLIC BLOOD PRESSURE < 130 MM HG: ICD-10-PCS | Mod: CPTII,S$GLB,, | Performed by: NURSE PRACTITIONER

## 2020-02-26 PROCEDURE — 3008F BODY MASS INDEX DOCD: CPT | Mod: CPTII,S$GLB,, | Performed by: NURSE PRACTITIONER

## 2020-02-26 PROCEDURE — 99999 PR PBB SHADOW E&M-EST. PATIENT-LVL III: ICD-10-PCS | Mod: PBBFAC,,, | Performed by: NURSE PRACTITIONER

## 2020-02-26 PROCEDURE — 3008F PR BODY MASS INDEX (BMI) DOCUMENTED: ICD-10-PCS | Mod: CPTII,S$GLB,, | Performed by: NURSE PRACTITIONER

## 2020-02-26 PROCEDURE — 99214 PR OFFICE/OUTPT VISIT, EST, LEVL IV, 30-39 MIN: ICD-10-PCS | Mod: S$GLB,,, | Performed by: NURSE PRACTITIONER

## 2020-02-26 NOTE — PROGRESS NOTES
"Razia Keyes 65 y.o. year-old female returns for management of obstructive sleep apnea and CPAP equipment check.     Snoring and air gasping continues to be resolved with APAP 5-14 cm. Continues to use it every night with > 4 hour compliance 100%. Despite not having overt air leaks predicted AHI is 13.1 with 90%tile pressure 13.5 - per Encore pt consistently reaching ceiling pressure. Denies issues with nasal/oral drying, nasal congestion, pressure intolerance, or aerophagia.     On today's [place] Sleepiness Scale the patient scores a 4.     Baseline Sleep Study: 01/21/2019  lb. AHI 28, RDI 52, O2 bassem 87.8%, % time < 90% SpO2: 0.7    Review of Systems: Sleep related symptoms as per HPI. Otherwise, a balance of 10 systems reviewed is negative.    Physical Exam:   /71 (BP Location: Right arm, Patient Position: Sitting, BP Method: Large (Automatic))   Pulse 63   Ht 5' 3" (1.6 m)   Wt 82 kg (180 lb 12.4 oz)   BMI 32.02 kg/m²   GENERAL: Well groomed    Assessment:     Obstructive sleep apnea, moderate by AHI, severe by RDI. The patient symptomatically has snoring and air gasping which improves with CPAP use. The patient is adherent on CPAP and experiencing symptomatic benefit despite mild residual AHI.  Medical co-morbidities: HTN and obesity. This warrants continued treatment for ZENAIDA.     Obesity, BMI > 30, discussed relationship between ZENAIDA and weight     Plan:     Continue APAP therapy, max pressure increased to 20 cm to address residual AHI, goal < 5. APAP 5-20 cm.  Will check efficacy on Encore in 2 weeks.     Education: During our discussion today, we talked about the etiology of obstructive sleep apnea as well as the potential ramifications of untreated sleep apnea, which could include daytime sleepiness, hypertension, heart disease and/or stroke. We discussed potential treatment options, which could include weight loss, body positioning, continuous positive airway pressure (CPAP), or referral " for surgical consideration.     Behavior modification which includes losing weight, exercising, changing the sleep position, abstaining from alcohol, and avoiding certain medications    Precautions: The patient was advised to abstain from driving should they feel sleepy or drowsy    RTC in 6-12 months, sooner if needed.

## 2020-02-27 ENCOUNTER — TELEPHONE (OUTPATIENT)
Dept: SLEEP MEDICINE | Facility: CLINIC | Age: 65
End: 2020-02-27

## 2020-02-27 DIAGNOSIS — G47.33 OBSTRUCTIVE SLEEP APNEA: Primary | ICD-10-CM

## 2020-02-27 NOTE — TELEPHONE ENCOUNTER
----- Message from Andree Coreas sent at 2/27/2020 10:16 AM CST -----  Regarding: Cpap Mask  Rupa De Oliveira NP / Nurse      Please wrote RX for ffm pt stated she don't wear a nasal mask last Rx written for nasal.      Thank You  Andree

## 2020-03-11 ENCOUNTER — PATIENT MESSAGE (OUTPATIENT)
Dept: SLEEP MEDICINE | Facility: CLINIC | Age: 65
End: 2020-03-11

## 2020-03-11 ENCOUNTER — TELEPHONE (OUTPATIENT)
Dept: SLEEP MEDICINE | Facility: CLINIC | Age: 65
End: 2020-03-11

## 2020-03-11 NOTE — TELEPHONE ENCOUNTER
----- Message from Rupa De Oliveira NP sent at 2/26/2020  7:52 AM CST -----  How's AHI on 5 - 20 cm.     Had predicted AHI 13.1 on 5-14 during 2/26/20 visit, increased ceiling pressure to 20 cm

## 2020-03-11 NOTE — TELEPHONE ENCOUNTER
APAP 5 - 20 cm, > 4 hours: 96.7%, predicted AHI 9.8 (down from 13.1), 90%: 14.1 cm     Plan: increase starting pressure to 10, max pressure 20 cm. Recheck efficacy in 3 weeks. Reminder set to check Encore.

## 2020-04-01 ENCOUNTER — TELEPHONE (OUTPATIENT)
Dept: SLEEP MEDICINE | Facility: CLINIC | Age: 65
End: 2020-04-01

## 2020-04-01 DIAGNOSIS — G47.33 OSA (OBSTRUCTIVE SLEEP APNEA): Primary | ICD-10-CM

## 2020-04-01 NOTE — TELEPHONE ENCOUNTER
02/08/2020 - 03/08/2020   Compliance Summary  Days with Device Usage:  30 days  Percentage of Days >=4 Hours: 96.7%  Average Usage (Days Used): 7 hrs. 38 mins. 53 secs.  Average Usage (All Days): 7 hrs. 38 mins. 53 secs.  Apnea Indices  Average AHI: 9.8  Average OA Index: 4.8  Average CA Index: 1.5    Large Leak  Average Time in Large Leak: 2 mins. 22 secs.  Average % of Night in Large Leak: 0.5%  Periodic Breathing  Average % of Night in PB: 1.5%    90%tile pressure: 14.1 cm, predicted AHI down to 9.8 from 13.1. However, up to date data not available for my review. [facility] messaged to address this. Will re-check Encore, reminder set for one week.

## 2020-04-01 NOTE — TELEPHONE ENCOUNTER
----- Message from Rupa De Oliveira NP sent at 3/11/2020  7:10 AM CDT -----  Changed APAP starting pressure to 10 cm (from 5 cm) max pressure 20 to further optimize AHI.     How's AHI on 10 - 20 cm?

## 2020-04-02 ENCOUNTER — TELEPHONE (OUTPATIENT)
Dept: SLEEP MEDICINE | Facility: CLINIC | Age: 65
End: 2020-04-02

## 2020-04-02 NOTE — TELEPHONE ENCOUNTER
DATE RANGE: 3/2/2020 - 3/31/2020  APAP 10-20 cm   Compliance Summary  Days with Device Usage:30 days  Percentage of Days >=4 Hours: 96.7%  Average Usage (Days Used): 7 hrs. 18 mins. 3 secs.  Average Usage (All Days): 7 hrs. 18 mins. 3 secs.  Apnea Indices  Average AHI: 8.9  Average OA Index: 3.6  Average CA Index: 2.1    Large Leak  Average Time in Large Leak: 1 mins. 4 secs.  Average % of Night in Large Leak: 0.2%  Periodic Breathing  Average % of Night in PB: 1.0%      90%tile pressure: 15.6 cm    Predicted AHI improved with APAP 10 - 20 cm, down from 13.1 to 8.9 with 90%tile pressure of 15.6 cm    Plan: continue APAP, starting pressure increased to 12 cm to 20 cm. Pt messaged via [facility] pt messaging system.

## 2020-04-02 NOTE — TELEPHONE ENCOUNTER
----- Message from Tayler Rosales, RRT sent at 4/1/2020  4:02 PM CDT -----  Regarding: Data  Pt's most up to date data is now visible in Encore.    Thank You  Amairani Rosales

## 2020-04-08 ENCOUNTER — TELEPHONE (OUTPATIENT)
Dept: SLEEP MEDICINE | Facility: CLINIC | Age: 65
End: 2020-04-08

## 2020-04-08 NOTE — TELEPHONE ENCOUNTER
----- Message from Rupa De Oliveira NP sent at 4/1/2020 10:57 AM CDT -----  APAP 10 - 20 cm, AHI down to 9.8 from 13.1, but data when checked on 4/1 up to 03/08/2020 only.     [facility]-Other order placed to address data upload.     Has this been addressed?

## 2020-07-10 ENCOUNTER — TELEPHONE (OUTPATIENT)
Dept: INTERNAL MEDICINE | Facility: CLINIC | Age: 65
End: 2020-07-10

## 2020-07-10 DIAGNOSIS — Z12.39 SCREENING FOR MALIGNANT NEOPLASM OF BREAST: Primary | ICD-10-CM

## 2020-07-10 NOTE — TELEPHONE ENCOUNTER
----- Message from Josh Bauer sent at 7/10/2020  3:22 PM CDT -----  Contact: Self [phone]  Patient would like an call back form the nurse in regards to Letter for Medical History, pt will like for the doctor to write a letter listing the medical history so patient can bring it to her job ([employer]) to [employer], please advise.

## 2020-07-10 NOTE — TELEPHONE ENCOUNTER
----- Message from Josh Bauer sent at 7/10/2020  3:28 PM CDT -----  Contact: Self [phone]  Caller is requesting to schedule their annual screening mammogram appointment. Order is not listed in Epic.  Please enter order and contact patient to schedule.  [facility]  Would the patient rather receive a phone call back or a response via [facility]?:  Call Back  Additional information:

## 2020-07-10 NOTE — TELEPHONE ENCOUNTER
Spoke to pt stated she will have to be seen by the doctor pt haven't been seen since December. Schedule appt for pt to be seen and sent message over to doctor for mammogram order.

## 2020-07-14 ENCOUNTER — HOSPITAL ENCOUNTER (OUTPATIENT)
Dept: RADIOLOGY | Facility: HOSPITAL | Age: 65
Discharge: HOME | End: 2020-07-14
Attending: INTERNAL MEDICINE
Payer: COMMERCIAL

## 2020-07-14 DIAGNOSIS — Z12.39 SCREENING FOR MALIGNANT NEOPLASM OF BREAST: ICD-10-CM

## 2020-07-14 PROCEDURE — 77063 MAMMO DIGITAL SCREENING BILAT WITH TOMOSYNTHESIS_CAD: ICD-10-PCS | Mod: 26,,, | Performed by: RADIOLOGY

## 2020-07-14 PROCEDURE — 77067 SCR MAMMO BI INCL CAD: CPT | Mod: 26,,, | Performed by: RADIOLOGY

## 2020-07-14 PROCEDURE — 77063 BREAST TOMOSYNTHESIS BI: CPT | Mod: 26,,, | Performed by: RADIOLOGY

## 2020-07-14 PROCEDURE — 77067 MAMMO DIGITAL SCREENING BILAT WITH TOMOSYNTHESIS_CAD: ICD-10-PCS | Mod: 26,,, | Performed by: RADIOLOGY

## 2020-07-14 PROCEDURE — 77067 SCR MAMMO BI INCL CAD: CPT | Mod: TC

## 2020-08-01 ENCOUNTER — OFFICE VISIT (OUTPATIENT)
Dept: INTERNAL MEDICINE | Facility: CLINIC | Age: 65
End: 2020-08-01
Payer: COMMERCIAL

## 2020-08-01 ENCOUNTER — LAB VISIT (OUTPATIENT)
Dept: LAB | Facility: HOSPITAL | Age: 65
End: 2020-08-01
Attending: INTERNAL MEDICINE
Payer: COMMERCIAL

## 2020-08-01 VITALS
WEIGHT: 181 LBS | SYSTOLIC BLOOD PRESSURE: 132 MMHG | HEIGHT: 63 IN | OXYGEN SATURATION: 99 % | DIASTOLIC BLOOD PRESSURE: 76 MMHG | HEART RATE: 76 BPM | BODY MASS INDEX: 32.07 KG/M2

## 2020-08-01 DIAGNOSIS — M10.9 PODAGRA: ICD-10-CM

## 2020-08-01 DIAGNOSIS — E55.9 VITAMIN D DEFICIENCY: ICD-10-CM

## 2020-08-01 DIAGNOSIS — G47.33 OBSTRUCTIVE SLEEP APNEA ON CPAP: ICD-10-CM

## 2020-08-01 DIAGNOSIS — D12.6 TUBULAR ADENOMA OF COLON: ICD-10-CM

## 2020-08-01 DIAGNOSIS — E79.0 HYPERURICEMIA: ICD-10-CM

## 2020-08-01 DIAGNOSIS — Z00.00 ANNUAL PHYSICAL EXAM: Primary | ICD-10-CM

## 2020-08-01 DIAGNOSIS — I10 ESSENTIAL HYPERTENSION: Chronic | ICD-10-CM

## 2020-08-01 DIAGNOSIS — Z28.82 VACCINE REFUSED BY PARENT: ICD-10-CM

## 2020-08-01 DIAGNOSIS — J30.1 SEASONAL ALLERGIC RHINITIS DUE TO POLLEN: Chronic | ICD-10-CM

## 2020-08-01 DIAGNOSIS — Z00.00 ANNUAL PHYSICAL EXAM: ICD-10-CM

## 2020-08-01 DIAGNOSIS — H40.1120 PRIMARY OPEN ANGLE GLAUCOMA OF LEFT EYE, UNSPECIFIED GLAUCOMA STAGE: Chronic | ICD-10-CM

## 2020-08-01 PROBLEM — R73.03 PREDIABETES: Status: ACTIVE | Noted: 2020-08-01

## 2020-08-01 LAB
25(OH)D3+25(OH)D2 SERPL-MCNC: 38 NG/ML (ref 30–96)
ALBUMIN SERPL BCP-MCNC: 3.9 G/DL (ref 3.5–5.2)
ALP SERPL-CCNC: 97 U/L (ref 55–135)
ALT SERPL W/O P-5'-P-CCNC: 10 U/L (ref 10–44)
ANION GAP SERPL CALC-SCNC: 10 MMOL/L (ref 8–16)
AST SERPL-CCNC: 59 U/L (ref 10–40)
BASOPHILS # BLD AUTO: 0.04 K/UL (ref 0–0.2)
BASOPHILS NFR BLD: 0.8 % (ref 0–1.9)
BILIRUB SERPL-MCNC: 0.4 MG/DL (ref 0.1–1)
BUN SERPL-MCNC: 11 MG/DL (ref 8–23)
CALCIUM SERPL-MCNC: 9.9 MG/DL (ref 8.7–10.5)
CHLORIDE SERPL-SCNC: 99 MMOL/L (ref 95–110)
CHOLEST SERPL-MCNC: 172 MG/DL (ref 120–199)
CHOLEST/HDLC SERPL: 2.6 {RATIO} (ref 2–5)
CO2 SERPL-SCNC: 31 MMOL/L (ref 23–29)
CREAT SERPL-MCNC: 1.1 MG/DL (ref 0.5–1.4)
DIFFERENTIAL METHOD: ABNORMAL
EOSINOPHIL # BLD AUTO: 0.1 K/UL (ref 0–0.5)
EOSINOPHIL NFR BLD: 2.3 % (ref 0–8)
ERYTHROCYTE [DISTWIDTH] IN BLOOD BY AUTOMATED COUNT: 12.3 % (ref 11.5–14.5)
EST. GFR  (AFRICAN AMERICAN): >60 ML/MIN/1.73 M^2
EST. GFR  (NON AFRICAN AMERICAN): 52.8 ML/MIN/1.73 M^2
ESTIMATED AVG GLUCOSE: 117 MG/DL (ref 68–131)
GLUCOSE SERPL-MCNC: 100 MG/DL (ref 70–110)
HBA1C MFR BLD HPLC: 5.7 % (ref 4–5.6)
HCT VFR BLD AUTO: 42.3 % (ref 37–48.5)
HDLC SERPL-MCNC: 65 MG/DL (ref 40–75)
HDLC SERPL: 37.8 % (ref 20–50)
HGB BLD-MCNC: 13.7 G/DL (ref 12–16)
IMM GRANULOCYTES # BLD AUTO: 0.01 K/UL (ref 0–0.04)
IMM GRANULOCYTES NFR BLD AUTO: 0.2 % (ref 0–0.5)
LDLC SERPL CALC-MCNC: 95.2 MG/DL (ref 63–159)
LYMPHOCYTES # BLD AUTO: 1.8 K/UL (ref 1–4.8)
LYMPHOCYTES NFR BLD: 34.9 % (ref 18–48)
MCH RBC QN AUTO: 27.8 PG (ref 27–31)
MCHC RBC AUTO-ENTMCNC: 32.4 G/DL (ref 32–36)
MCV RBC AUTO: 86 FL (ref 82–98)
MONOCYTES # BLD AUTO: 0.5 K/UL (ref 0.3–1)
MONOCYTES NFR BLD: 9.8 % (ref 4–15)
NEUTROPHILS # BLD AUTO: 2.7 K/UL (ref 1.8–7.7)
NEUTROPHILS NFR BLD: 52 % (ref 38–73)
NONHDLC SERPL-MCNC: 107 MG/DL
NRBC BLD-RTO: 0 /100 WBC
PLATELET # BLD AUTO: 393 K/UL (ref 150–350)
PMV BLD AUTO: 9.9 FL (ref 9.2–12.9)
POTASSIUM SERPL-SCNC: 3 MMOL/L (ref 3.5–5.1)
PROT SERPL-MCNC: 8.4 G/DL (ref 6–8.4)
RBC # BLD AUTO: 4.92 M/UL (ref 4–5.4)
SODIUM SERPL-SCNC: 140 MMOL/L (ref 136–145)
TRIGL SERPL-MCNC: 59 MG/DL (ref 30–150)
URATE SERPL-MCNC: 9.7 MG/DL (ref 2.4–5.7)
WBC # BLD AUTO: 5.22 K/UL (ref 3.9–12.7)

## 2020-08-01 PROCEDURE — 99999 PR PBB SHADOW E&M-EST. PATIENT-LVL IV: ICD-10-PCS | Mod: PBBFAC,,, | Performed by: INTERNAL MEDICINE

## 2020-08-01 PROCEDURE — 36415 COLL VENOUS BLD VENIPUNCTURE: CPT

## 2020-08-01 PROCEDURE — 99214 PR OFFICE/OUTPT VISIT, EST, LEVL IV, 30-39 MIN: ICD-10-PCS | Mod: S$GLB,,, | Performed by: INTERNAL MEDICINE

## 2020-08-01 PROCEDURE — 3075F PR MOST RECENT SYSTOLIC BLOOD PRESS GE 130-139MM HG: ICD-10-PCS | Mod: CPTII,S$GLB,, | Performed by: INTERNAL MEDICINE

## 2020-08-01 PROCEDURE — 80061 LIPID PANEL: CPT

## 2020-08-01 PROCEDURE — 82306 VITAMIN D 25 HYDROXY: CPT

## 2020-08-01 PROCEDURE — 3075F SYST BP GE 130 - 139MM HG: CPT | Mod: CPTII,S$GLB,, | Performed by: INTERNAL MEDICINE

## 2020-08-01 PROCEDURE — 3008F PR BODY MASS INDEX (BMI) DOCUMENTED: ICD-10-PCS | Mod: CPTII,S$GLB,, | Performed by: INTERNAL MEDICINE

## 2020-08-01 PROCEDURE — 84550 ASSAY OF BLOOD/URIC ACID: CPT

## 2020-08-01 PROCEDURE — 99999 PR PBB SHADOW E&M-EST. PATIENT-LVL IV: CPT | Mod: PBBFAC,,, | Performed by: INTERNAL MEDICINE

## 2020-08-01 PROCEDURE — 1101F PT FALLS ASSESS-DOCD LE1/YR: CPT | Mod: CPTII,S$GLB,, | Performed by: INTERNAL MEDICINE

## 2020-08-01 PROCEDURE — 3078F PR MOST RECENT DIASTOLIC BLOOD PRESSURE < 80 MM HG: ICD-10-PCS | Mod: CPTII,S$GLB,, | Performed by: INTERNAL MEDICINE

## 2020-08-01 PROCEDURE — 86703 HIV-1/HIV-2 1 RESULT ANTBDY: CPT

## 2020-08-01 PROCEDURE — 1101F PR PT FALLS ASSESS DOC 0-1 FALLS W/OUT INJ PAST YR: ICD-10-PCS | Mod: CPTII,S$GLB,, | Performed by: INTERNAL MEDICINE

## 2020-08-01 PROCEDURE — 3078F DIAST BP <80 MM HG: CPT | Mod: CPTII,S$GLB,, | Performed by: INTERNAL MEDICINE

## 2020-08-01 PROCEDURE — 83036 HEMOGLOBIN GLYCOSYLATED A1C: CPT

## 2020-08-01 PROCEDURE — 3008F BODY MASS INDEX DOCD: CPT | Mod: CPTII,S$GLB,, | Performed by: INTERNAL MEDICINE

## 2020-08-01 PROCEDURE — 80053 COMPREHEN METABOLIC PANEL: CPT

## 2020-08-01 PROCEDURE — 99214 OFFICE O/P EST MOD 30 MIN: CPT | Mod: S$GLB,,, | Performed by: INTERNAL MEDICINE

## 2020-08-01 PROCEDURE — 85025 COMPLETE CBC W/AUTO DIFF WBC: CPT

## 2020-08-01 RX ORDER — ERGOCALCIFEROL 1.25 MG/1
50000 CAPSULE ORAL
Qty: 13 CAPSULE | Refills: 4 | Status: SHIPPED | OUTPATIENT
Start: 2020-08-01 | End: 2021-08-27 | Stop reason: SDUPTHER

## 2020-08-01 RX ORDER — ATENOLOL AND CHLORTHALIDONE TABLET 50; 25 MG/1; MG/1
1 TABLET ORAL DAILY
Qty: 90 TABLET | Refills: 4 | Status: SHIPPED | OUTPATIENT
Start: 2020-08-01 | End: 2021-01-07

## 2020-08-01 RX ORDER — LATANOPROST 50 UG/ML
1 SOLUTION/ DROPS OPHTHALMIC NIGHTLY
COMMUNITY
Start: 2020-07-07 | End: 2023-03-02

## 2020-08-01 RX ORDER — MELOXICAM 15 MG/1
15 TABLET ORAL DAILY PRN
Qty: 90 TABLET | Refills: 3 | Status: SHIPPED | OUTPATIENT
Start: 2020-08-01 | End: 2021-12-21

## 2020-08-01 RX ORDER — ALLOPURINOL 100 MG/1
100 TABLET ORAL DAILY
Qty: 90 TABLET | Refills: 3 | Status: SHIPPED | OUTPATIENT
Start: 2020-08-01 | End: 2021-08-27 | Stop reason: SDUPTHER

## 2020-08-01 NOTE — PATIENT INSTRUCTIONS
Eating to Prevent Gout  Gout is a painful form of arthritis caused by an excess of uric acid. This is a waste product made by the body. It builds up in the body and forms crystals that collect in the joints, bringing on a gout attack. Alcohol and certain foods can trigger a gout attack. Below are some guidelines for changing your diet to help you manage gout. Your healthcare provider can work with you to determine the best eating plan for you. Know that diet is only one part of managing gout. Take your medicines as prescribed and follow the other guidelines your healthcare provider has given you.  Foods to limit  Eating too many foods containing purines may increase the levels of uric acid in your body and increase your risk for a gout attack. It may be best to limit these high-purine foods:  · Alcohol (beer, red wine). You may be told to avoid alcohol completely.  · Certain fish (anchovies, sardines, fish roes, herring, tuna, mussels, codfish, scallops, trout, and anderson)  · Certain meats (red meat, processed meat, barnes, turkey, wild game, and goose)  · Sauces and gravies made with meat  · Organ meats (such as liver, kidneys, sweetbreads, and tripe)  · Legumes (such as dried beans, peas)  · Mushrooms, spinach, asparagus, and cauliflower  · Yeast and yeast extract supplements  Foods to try  Some foods may be helpful for people with gout. You may want to try adding some of the following foods to your diet:  · Dark berries: These include blueberries, blackberries, and cherries. These berries contain chemicals that may lower uric acid.  · Tofu: Tofu, which is made from soy, is a good source of protein. Studies have shown that it may be a better choice than meat for people with gout.  · Omega fatty acids: These acids are found in fatty fish (such as salmon), certain oils (such as flax, olive, or nut oils), or nuts. They may help prevent inflammation due to gout.  The following guidelines are recommended by the  American Medical Association for people with gout. Your diet should be:  · High in fiber, whole grains, fruits, and vegetables.  · Low in protein (15% of calories should come from protein. Choose lean sources such as soy, lean meats, and poultry).  · Low in fat (no more than 30% of calories should come from fat, with only 10% coming from animal fat).   Date Last Reviewed: 6/17/2015  © 9405-4093 [employer]. [address] [place] [address], [place] [address]. All rights reserved. This information is not intended as a substitute for professional medical care. Always follow your healthcare professional's instructions.

## 2020-08-01 NOTE — PROGRESS NOTES
INTERNAL MEDICINE CLINIC  Follow-up Visit Progress Note     PRESENTING HISTORY     PCP: Shaq Sheppard MD    Last Clinic Visit with me:  8-6-2019    Current Chief Complaint/Problem:    Chief Complaint   Patient presents with    Annual Exam     History of Present Illness & ROS: Ms. Razia Keyes is a 65 y.o. female.    Right toe starting hurting Tuesday afternoon.  Did not take any med.     Review of Systems:  Answers for HPI/ROS submitted by the patient on 7/30/2020   activity change: Yes  unexpected weight change: No  neck pain: No  hearing loss: No  rhinorrhea: Yes  trouble swallowing: No  eye discharge: No  visual disturbance: No  chest tightness: No  wheezing: No  chest pain: No  palpitations: No  blood in stool: No  constipation: Yes  vomiting: No  diarrhea: No  polydipsia: No  polyuria: No  difficulty urinating: No  hematuria: No  menstrual problem: No  dysuria: No  joint swelling: No  arthralgias: No  headaches: No  confusion: No  dysphoric mood: No      PAST HISTORY:     Past Medical History:   Diagnosis Date    Elevated HDL 02/03/2015    Essential hypertension 11/27/2012    Obstructive sleep apnea on CPAP     Primary open angle glaucoma (POAG) 10/17/2018    Seasonal allergic rhinitis due to pollen 10/17/2018    Tubular adenoma of colon (cecum) 12/3/2018    Last C-scope .  Next in 11- 2023    Vaccine refused by parent 8/6/2019    Vitamin D deficiency 10/17/2018       Past Surgical History:   Procedure Laterality Date    COLONOSCOPY  2007    Normal 2007. She should return in 10 years    COLONOSCOPY N/A 11/21/2018    Procedure: COLONOSCOPY;  Surgeon: Brody August MD;  Location: [address]);  Service: Endoscopy;  Laterality: N/A;    HYSTERECTOMY      Age 27       Family History   Problem Relation Age of Onset    Lymphoma Father     Diabetes Mother     Dementia Mother     Anesthesia problems Maternal Uncle     Sleep apnea Brother     Gout Brother        Social History      Socioeconomic History    Marital status: Single     Spouse name: Not on file    Number of children: 0    Years of education: Not on file    Highest education level: Not on file   Occupational History     Employer: [employer]   Social Needs    Financial resource strain: Not hard at all    Food insecurity     Worry: Never true     Inability: Never true    Transportation needs     Medical: No     Non-medical: No   Tobacco Use    Smoking status: Never Smoker    Smokeless tobacco: Never Used    Tobacco comment: 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 She exercises by walking about at school and in the stairways.   Substance and Sexual Activity    Alcohol use: Yes     Frequency: Monthly or less     Drinks per session: 1 or 2     Binge frequency: Never     Comment: Social    Drug use: No    Sexual activity: Not Currently     Partners: Male   Lifestyle    Physical activity     Days per week: 2 days     Minutes per session: 40 min    Stress: Not on file   Relationships    Social connections     Talks on phone: Three times a week     Gets together: Patient refused     Attends [employer] service: Not on file     Active member of club or organization: Yes     Attends meetings of clubs or organizations: 1 to 4 times per year     Relationship status: Never    Other Topics Concern    Not on file   Social History Narrative    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        MEDICATIONS & ALLERGIES:     Current Outpatient Medications on File Prior to Visit   Medication Sig Dispense Refill    levocetirizine (XYZAL) 5 MG tablet Take 1 tablet (5 mg total) by mouth daily as needed for Allergies. 30 tablet 11    travoprost (TRAVATAN Z) 0.004 % ophthalmic solution Place 1 drop into both eyes every evening. 2 Bottle 11    [DISCONTINUED] atenolol-chlorthalidone (TENORETIC) 50-25 mg Tab Take 1 tablet by mouth once daily. 90  tablet 4    [DISCONTINUED] ergocalciferol (VITAMIN D2) 50,000 unit Cap Take 1 capsule (50,000 Units total) by mouth every 7 days. 13 capsule 4    latanoprost 0.005 % ophthalmic solution Place 1 drop into both eyes every evening.         Review of patient's allergies indicates:  No Known Allergies    Medications Reconciliation:   I have reconciled the patient's home medications and discharge medications with the patient/family. I have updated all changes.  Refer to After-Visit Medication List.    OBJECTIVE:     Vital Signs:  Vitals:    08/01/20 1050   BP: 132/76   Pulse: 76     Wt Readings from Last 1 Encounters:   08/01/20 1050 82.1 kg (181 lb)     Body mass index is 32.06 kg/m².     Physical Exam:  General: Well developed, well nourished. No distress.  HEENT: Head is normocephalic, atraumatic   Eyes: Clear conjunctiva.  Neck: Supple, symmetrical neck; trachea midline.  Lungs: Clear to auscultation bilaterally and normal respiratory effort.  Cardiovascular: Heart with regular rate and rhythm.    Extremities: No LE edema. Pulses 2+ and symmetric.   Abdomen: Abdomen is soft, non-tender non-distended with normal bowel sounds.  Skin: Skin color, texture, turgor normal.   Musculoskeletal: Normal gait.   Lymph Nodes: No cervical or supraclavicular adenopathy.  Psychiatric: Normal affect. Alert.      Right podagra    Laboratory  Lab Results   Component Value Date    WBC 5.22 08/01/2020    HGB 13.7 08/01/2020    HCT 42.3 08/01/2020     (H) 08/01/2020    CHOL 172 08/01/2020    TRIG 59 08/01/2020    HDL 65 08/01/2020    ALT 10 08/01/2020    AST 59 (H) 08/01/2020     08/01/2020    K 3.0 (L) 08/01/2020    CL 99 08/01/2020    CREATININE 1.1 08/01/2020    BUN 11 08/01/2020    CO2 31 (H) 08/01/2020    TSH 2.004 01/09/2020    HGBA1C 5.7 (H) 08/01/2020        Ref. Range 2/3/2015 14:52 1/22/2016 11:35 8/6/2019 15:53   Vit D, 25-Hydroxy Latest Ref Range: 30 - 96 ng/mL 13 (L) 28 (L) 39         ASSESSMENT & PLAN:      Annual physical exam  -     Hemoglobin A1C; Future; Expected date: 08/01/2020  -     Vitamin D; Future; Expected date: 01/28/2021  -     HIV 1/2 Ag/Ab (4th Gen); Future; Expected date: 08/01/2020  -     DXA Bone Density Spine And Hip; Future; Expected date: 08/01/2020  -     Lipid Panel; Future; Expected date: 08/01/2020  -     Comprehensive metabolic panel; Future; Expected date: 08/01/2020  -     CBC auto differential; Future; Expected date: 08/01/2020    Essential hypertension  - Controlled. Refill:  -     atenolol-chlorthalidone (TENORETIC) 50-25 mg Tab; Take 1 tablet by mouth once daily.        Seasonal allergic rhinitis due to pollen  - On PRN Xyzal.     Primary open angle glaucoma (POAG) of both eyes, mild stage   She is followed by outside eye doctor   On  travoprost / lantanoprost (one of them depending cost)     Vitamin D deficiency  - On Vitamin D2 50,000 weekly.  -     ergocalciferol (VITAMIN D2) 50,000 unit Cap; Take 1 capsule (50,000 Units total) by mouth every 7 days.       Tubular adenoma of colon (cecum)  Last C-scope .  Next in 11- 2023     ZENAIDA (obstructive sleep apnea) - on CPAP.  - On CPAP doing well.     Vaccine refused by parent  - Patient does not want any vaccine.    Tubular Adenoma of colon  - Last scope . Next in .     Prediabetes  - Monitor with diet.    Podagra  Hyperuricemia  -     Uric acid; Future; Expected date: 08/01/2020  -     meloxicam (MOBIC) 15 MG tablet; Take 1 tablet (15 mg total) by mouth daily as needed for Pain (gout. Take with food.).  Dispense: 90 tablet; Refill: 3  -    Rx: allopurinol 100 mg daily.    Preventive Health Maintenance:  Refused Tdap, Shingle vaccine and Flu vaccine.        Eye: Dr. Mckenzie Rodriguez ([facility])      Return to Clinic for Follow Up with me:   1 Year.        Scheduled Follow-up :  Future Appointments   Date Time Provider Department Center   8/7/2020 12:40 PM [facility], D[facility]1 [facility] [address]       After Visit  Medication List :     Medication List          Accurate as of August 1, 2020  1:59 PM. If you have any questions, ask your nurse or doctor.            START taking these medications    allopurinoL 100 MG tablet  Commonly known as: ZYLOPRIM  Take 1 tablet (100 mg total) by mouth once daily.  Started by: Shaq Sheppard MD     meloxicam 15 MG tablet  Commonly known as: MOBIC  Take 1 tablet (15 mg total) by mouth daily as needed for Pain (gout. Take with food.).  Started by: Shaq Sheppard MD        CONTINUE taking these medications    atenoloL-chlorthalidone 50-25 mg Tab  Commonly known as: TENORETIC  Take 1 tablet by mouth once daily.     ergocalciferol 50,000 unit Cap  Commonly known as: VITAMIN D2  Take 1 capsule (50,000 Units total) by mouth every 7 days.     latanoprost 0.005 % ophthalmic solution     levocetirizine 5 MG tablet  Commonly known as: XYZAL  Take 1 tablet (5 mg total) by mouth daily as needed for Allergies.     travoprost 0.004 % ophthalmic solution  Commonly known as: TRAVATAN Z  Place 1 drop into both eyes every evening.           Where to Get Your Medications      These medications were sent to [id number] - [place], [place] - [address]  [address] [place] [address]    Phone: [phone]   · allopurinoL 100 MG tablet  · atenoloL-chlorthalidone 50-25 mg Tab  · ergocalciferol 50,000 unit Cap  · meloxicam 15 MG tablet         Signing Physician:  Shaq Sheppard MD

## 2020-08-03 LAB — HIV 1+2 AB+HIV1 P24 AG SERPL QL IA: NEGATIVE

## 2020-08-07 ENCOUNTER — HOSPITAL ENCOUNTER (OUTPATIENT)
Dept: RADIOLOGY | Facility: CLINIC | Age: 65
Discharge: HOME | End: 2020-08-07
Attending: INTERNAL MEDICINE
Payer: COMMERCIAL

## 2020-08-07 DIAGNOSIS — I10 ESSENTIAL HYPERTENSION: Chronic | ICD-10-CM

## 2020-08-07 DIAGNOSIS — Z00.00 ANNUAL PHYSICAL EXAM: ICD-10-CM

## 2020-08-07 PROCEDURE — 77080 DEXA BONE DENSITY SPINE HIP: ICD-10-PCS | Mod: 26,,, | Performed by: INTERNAL MEDICINE

## 2020-08-07 PROCEDURE — 77080 DXA BONE DENSITY AXIAL: CPT | Mod: 26,,, | Performed by: INTERNAL MEDICINE

## 2020-08-07 PROCEDURE — 77080 DXA BONE DENSITY AXIAL: CPT | Mod: TC

## 2020-10-09 DIAGNOSIS — J30.1 SEASONAL ALLERGIC RHINITIS DUE TO POLLEN: Chronic | ICD-10-CM

## 2020-10-09 RX ORDER — LEVOCETIRIZINE DIHYDROCHLORIDE 5 MG/1
5 TABLET, FILM COATED ORAL DAILY PRN
Qty: 30 TABLET | Refills: 11 | Status: SHIPPED | OUTPATIENT
Start: 2020-10-09 | End: 2021-12-21

## 2020-10-12 ENCOUNTER — TELEPHONE (OUTPATIENT)
Dept: INTERNAL MEDICINE | Facility: CLINIC | Age: 65
End: 2020-10-12

## 2020-10-12 NOTE — TELEPHONE ENCOUNTER
Spoke with pt . Informed her Xyzal is ready for  . Pt asked about upcoming appt with Dr Sheppard . Informed pt so far there is none. Pt  Said a Sat is a good day for her. Informed pt usually a courtesy phone call is given to the pt  to remind them of the upcoming Dr appts . Verbalized understanding.

## 2020-10-15 DIAGNOSIS — E55.9 VITAMIN D DEFICIENCY: ICD-10-CM

## 2020-10-15 RX ORDER — ERGOCALCIFEROL 1.25 MG/1
50000 CAPSULE ORAL
Qty: 13 CAPSULE | Refills: 4 | Status: CANCELLED | OUTPATIENT
Start: 2020-10-15

## 2020-10-15 NOTE — TELEPHONE ENCOUNTER
----- Message from Beverly Leggett MA sent at 10/15/2020  1:16 PM CDT -----  Contact: Self [phone]    ----- Message -----  From: Teri Gibbs  Sent: 10/15/2020   1:10 PM CDT  To: [facility]    Returning a phone call.  Who left a message for the patient:  nurse  Do they know what this is regarding:  unknown / no vm left  Would they like a phone call back or a response via [facility]:  call back

## 2021-01-07 ENCOUNTER — OFFICE VISIT (OUTPATIENT)
Dept: INTERNAL MEDICINE | Facility: CLINIC | Age: 66
End: 2021-01-07
Payer: COMMERCIAL

## 2021-01-07 VITALS
SYSTOLIC BLOOD PRESSURE: 120 MMHG | BODY MASS INDEX: 33.68 KG/M2 | HEIGHT: 63 IN | DIASTOLIC BLOOD PRESSURE: 80 MMHG | WEIGHT: 190.06 LBS | OXYGEN SATURATION: 99 % | HEART RATE: 79 BPM

## 2021-01-07 DIAGNOSIS — I10 ESSENTIAL HYPERTENSION: Primary | Chronic | ICD-10-CM

## 2021-01-07 DIAGNOSIS — D12.6 TUBULAR ADENOMA OF COLON: ICD-10-CM

## 2021-01-07 DIAGNOSIS — J30.1 SEASONAL ALLERGIC RHINITIS DUE TO POLLEN: Chronic | ICD-10-CM

## 2021-01-07 DIAGNOSIS — Z28.82 VACCINE REFUSED BY PARENT: ICD-10-CM

## 2021-01-07 DIAGNOSIS — G47.33 OBSTRUCTIVE SLEEP APNEA ON CPAP: ICD-10-CM

## 2021-01-07 DIAGNOSIS — E79.0 HYPERURICEMIA: ICD-10-CM

## 2021-01-07 DIAGNOSIS — R73.03 PREDIABETES: ICD-10-CM

## 2021-01-07 DIAGNOSIS — E55.9 VITAMIN D DEFICIENCY: ICD-10-CM

## 2021-01-07 DIAGNOSIS — H40.1120 PRIMARY OPEN ANGLE GLAUCOMA OF LEFT EYE, UNSPECIFIED GLAUCOMA STAGE: Chronic | ICD-10-CM

## 2021-01-07 DIAGNOSIS — M10.9 PODAGRA: ICD-10-CM

## 2021-01-07 PROCEDURE — 99214 OFFICE O/P EST MOD 30 MIN: CPT | Mod: S$GLB,,, | Performed by: INTERNAL MEDICINE

## 2021-01-07 PROCEDURE — 3288F PR FALLS RISK ASSESSMENT DOCUMENTED: ICD-10-PCS | Mod: CPTII,S$GLB,, | Performed by: INTERNAL MEDICINE

## 2021-01-07 PROCEDURE — 3008F BODY MASS INDEX DOCD: CPT | Mod: CPTII,S$GLB,, | Performed by: INTERNAL MEDICINE

## 2021-01-07 PROCEDURE — 1126F PR PAIN SEVERITY QUANTIFIED, NO PAIN PRESENT: ICD-10-PCS | Mod: S$GLB,,, | Performed by: INTERNAL MEDICINE

## 2021-01-07 PROCEDURE — 1101F PT FALLS ASSESS-DOCD LE1/YR: CPT | Mod: CPTII,S$GLB,, | Performed by: INTERNAL MEDICINE

## 2021-01-07 PROCEDURE — 3079F DIAST BP 80-89 MM HG: CPT | Mod: CPTII,S$GLB,, | Performed by: INTERNAL MEDICINE

## 2021-01-07 PROCEDURE — 99999 PR PBB SHADOW E&M-EST. PATIENT-LVL III: CPT | Mod: PBBFAC,,, | Performed by: INTERNAL MEDICINE

## 2021-01-07 PROCEDURE — 3079F PR MOST RECENT DIASTOLIC BLOOD PRESSURE 80-89 MM HG: ICD-10-PCS | Mod: CPTII,S$GLB,, | Performed by: INTERNAL MEDICINE

## 2021-01-07 PROCEDURE — 99999 PR PBB SHADOW E&M-EST. PATIENT-LVL III: ICD-10-PCS | Mod: PBBFAC,,, | Performed by: INTERNAL MEDICINE

## 2021-01-07 PROCEDURE — 1126F AMNT PAIN NOTED NONE PRSNT: CPT | Mod: S$GLB,,, | Performed by: INTERNAL MEDICINE

## 2021-01-07 PROCEDURE — 1101F PR PT FALLS ASSESS DOC 0-1 FALLS W/OUT INJ PAST YR: ICD-10-PCS | Mod: CPTII,S$GLB,, | Performed by: INTERNAL MEDICINE

## 2021-01-07 PROCEDURE — 3008F PR BODY MASS INDEX (BMI) DOCUMENTED: ICD-10-PCS | Mod: CPTII,S$GLB,, | Performed by: INTERNAL MEDICINE

## 2021-01-07 PROCEDURE — 99214 PR OFFICE/OUTPT VISIT, EST, LEVL IV, 30-39 MIN: ICD-10-PCS | Mod: S$GLB,,, | Performed by: INTERNAL MEDICINE

## 2021-01-07 PROCEDURE — 3074F PR MOST RECENT SYSTOLIC BLOOD PRESSURE < 130 MM HG: ICD-10-PCS | Mod: CPTII,S$GLB,, | Performed by: INTERNAL MEDICINE

## 2021-01-07 PROCEDURE — 3074F SYST BP LT 130 MM HG: CPT | Mod: CPTII,S$GLB,, | Performed by: INTERNAL MEDICINE

## 2021-01-07 PROCEDURE — 3288F FALL RISK ASSESSMENT DOCD: CPT | Mod: CPTII,S$GLB,, | Performed by: INTERNAL MEDICINE

## 2021-01-07 RX ORDER — SPIRONOLACTONE 25 MG/1
25 TABLET ORAL DAILY
Qty: 90 TABLET | Refills: 3 | Status: SHIPPED | OUTPATIENT
Start: 2021-01-07 | End: 2021-12-21 | Stop reason: SDUPTHER

## 2021-01-07 RX ORDER — ATENOLOL 50 MG/1
50 TABLET ORAL DAILY
Qty: 90 TABLET | Refills: 3 | Status: SHIPPED | OUTPATIENT
Start: 2021-01-07 | End: 2021-12-06 | Stop reason: SDUPTHER

## 2021-03-01 ENCOUNTER — TELEPHONE (OUTPATIENT)
Dept: INTERNAL MEDICINE | Facility: CLINIC | Age: 66
End: 2021-03-01

## 2021-04-05 ENCOUNTER — TELEPHONE (OUTPATIENT)
Dept: SLEEP MEDICINE | Facility: CLINIC | Age: 66
End: 2021-04-05

## 2021-04-16 ENCOUNTER — PATIENT MESSAGE (OUTPATIENT)
Dept: RESEARCH | Facility: HOSPITAL | Age: 66
End: 2021-04-16

## 2021-05-26 ENCOUNTER — OFFICE VISIT (OUTPATIENT)
Dept: SLEEP MEDICINE | Facility: CLINIC | Age: 66
End: 2021-05-26
Payer: COMMERCIAL

## 2021-05-26 VITALS
DIASTOLIC BLOOD PRESSURE: 78 MMHG | SYSTOLIC BLOOD PRESSURE: 125 MMHG | HEIGHT: 63 IN | HEART RATE: 81 BPM | BODY MASS INDEX: 33.68 KG/M2 | WEIGHT: 190.06 LBS

## 2021-05-26 DIAGNOSIS — G47.33 OSA (OBSTRUCTIVE SLEEP APNEA): Primary | ICD-10-CM

## 2021-05-26 DIAGNOSIS — I10 ESSENTIAL HYPERTENSION: ICD-10-CM

## 2021-05-26 PROCEDURE — 3008F BODY MASS INDEX DOCD: CPT | Mod: CPTII,S$GLB,, | Performed by: INTERNAL MEDICINE

## 2021-05-26 PROCEDURE — 1126F AMNT PAIN NOTED NONE PRSNT: CPT | Mod: S$GLB,,, | Performed by: INTERNAL MEDICINE

## 2021-05-26 PROCEDURE — 1101F PT FALLS ASSESS-DOCD LE1/YR: CPT | Mod: CPTII,S$GLB,, | Performed by: INTERNAL MEDICINE

## 2021-05-26 PROCEDURE — 99999 PR PBB SHADOW E&M-EST. PATIENT-LVL III: CPT | Mod: PBBFAC,,, | Performed by: INTERNAL MEDICINE

## 2021-05-26 PROCEDURE — 3008F PR BODY MASS INDEX (BMI) DOCUMENTED: ICD-10-PCS | Mod: CPTII,S$GLB,, | Performed by: INTERNAL MEDICINE

## 2021-05-26 PROCEDURE — 99999 PR PBB SHADOW E&M-EST. PATIENT-LVL III: ICD-10-PCS | Mod: PBBFAC,,, | Performed by: INTERNAL MEDICINE

## 2021-05-26 PROCEDURE — 1159F PR MEDICATION LIST DOCUMENTED IN MEDICAL RECORD: ICD-10-PCS | Mod: S$GLB,,, | Performed by: INTERNAL MEDICINE

## 2021-05-26 PROCEDURE — 99213 PR OFFICE/OUTPT VISIT, EST, LEVL III, 20-29 MIN: ICD-10-PCS | Mod: S$GLB,,, | Performed by: INTERNAL MEDICINE

## 2021-05-26 PROCEDURE — 3288F PR FALLS RISK ASSESSMENT DOCUMENTED: ICD-10-PCS | Mod: CPTII,S$GLB,, | Performed by: INTERNAL MEDICINE

## 2021-05-26 PROCEDURE — 1159F MED LIST DOCD IN RCRD: CPT | Mod: S$GLB,,, | Performed by: INTERNAL MEDICINE

## 2021-05-26 PROCEDURE — 1101F PR PT FALLS ASSESS DOC 0-1 FALLS W/OUT INJ PAST YR: ICD-10-PCS | Mod: CPTII,S$GLB,, | Performed by: INTERNAL MEDICINE

## 2021-05-26 PROCEDURE — 99213 OFFICE O/P EST LOW 20 MIN: CPT | Mod: S$GLB,,, | Performed by: INTERNAL MEDICINE

## 2021-05-26 PROCEDURE — 1126F PR PAIN SEVERITY QUANTIFIED, NO PAIN PRESENT: ICD-10-PCS | Mod: S$GLB,,, | Performed by: INTERNAL MEDICINE

## 2021-05-26 PROCEDURE — 3288F FALL RISK ASSESSMENT DOCD: CPT | Mod: CPTII,S$GLB,, | Performed by: INTERNAL MEDICINE

## 2021-06-24 ENCOUNTER — TELEPHONE (OUTPATIENT)
Dept: INTERNAL MEDICINE | Facility: CLINIC | Age: 66
End: 2021-06-24

## 2021-06-24 ENCOUNTER — LAB VISIT (OUTPATIENT)
Dept: LAB | Facility: HOSPITAL | Age: 66
End: 2021-06-24
Attending: INTERNAL MEDICINE
Payer: COMMERCIAL

## 2021-06-24 ENCOUNTER — OFFICE VISIT (OUTPATIENT)
Dept: INTERNAL MEDICINE | Facility: CLINIC | Age: 66
End: 2021-06-24
Payer: COMMERCIAL

## 2021-06-24 VITALS
SYSTOLIC BLOOD PRESSURE: 118 MMHG | DIASTOLIC BLOOD PRESSURE: 80 MMHG | HEART RATE: 75 BPM | OXYGEN SATURATION: 98 % | BODY MASS INDEX: 32.43 KG/M2 | HEIGHT: 63 IN | WEIGHT: 183 LBS

## 2021-06-24 DIAGNOSIS — L29.9 PRURITUS: ICD-10-CM

## 2021-06-24 DIAGNOSIS — L29.9 PRURITUS: Primary | ICD-10-CM

## 2021-06-24 LAB
ALBUMIN SERPL BCP-MCNC: 3.7 G/DL (ref 3.5–5.2)
ALP SERPL-CCNC: 96 U/L (ref 55–135)
ALT SERPL W/O P-5'-P-CCNC: 12 U/L (ref 10–44)
ANION GAP SERPL CALC-SCNC: 10 MMOL/L (ref 8–16)
AST SERPL-CCNC: 66 U/L (ref 10–40)
BILIRUB SERPL-MCNC: 0.5 MG/DL (ref 0.1–1)
BUN SERPL-MCNC: 15 MG/DL (ref 8–23)
CALCIUM SERPL-MCNC: 10 MG/DL (ref 8.7–10.5)
CHLORIDE SERPL-SCNC: 106 MMOL/L (ref 95–110)
CO2 SERPL-SCNC: 25 MMOL/L (ref 23–29)
CREAT SERPL-MCNC: 1.2 MG/DL (ref 0.5–1.4)
EST. GFR  (AFRICAN AMERICAN): 54.4 ML/MIN/1.73 M^2
EST. GFR  (NON AFRICAN AMERICAN): 47.2 ML/MIN/1.73 M^2
ESTIMATED AVG GLUCOSE: 114 MG/DL (ref 68–131)
FERRITIN SERPL-MCNC: 315 NG/ML (ref 20–300)
GLUCOSE SERPL-MCNC: 92 MG/DL (ref 70–110)
HBA1C MFR BLD: 5.6 % (ref 4–5.6)
IRON SERPL-MCNC: 75 UG/DL (ref 30–160)
POTASSIUM SERPL-SCNC: 4.7 MMOL/L (ref 3.5–5.1)
PROT SERPL-MCNC: 7.8 G/DL (ref 6–8.4)
SATURATED IRON: 21 % (ref 20–50)
SODIUM SERPL-SCNC: 141 MMOL/L (ref 136–145)
TOTAL IRON BINDING CAPACITY: 361 UG/DL (ref 250–450)
TRANSFERRIN SERPL-MCNC: 244 MG/DL (ref 200–375)

## 2021-06-24 PROCEDURE — 36415 COLL VENOUS BLD VENIPUNCTURE: CPT | Performed by: NURSE PRACTITIONER

## 2021-06-24 PROCEDURE — 3008F PR BODY MASS INDEX (BMI) DOCUMENTED: ICD-10-PCS | Mod: CPTII,S$GLB,, | Performed by: NURSE PRACTITIONER

## 2021-06-24 PROCEDURE — 96372 PR INJECTION,THERAP/PROPH/DIAG2ST, IM OR SUBCUT: ICD-10-PCS | Mod: S$GLB,,, | Performed by: NURSE PRACTITIONER

## 2021-06-24 PROCEDURE — 96372 THER/PROPH/DIAG INJ SC/IM: CPT | Mod: S$GLB,,, | Performed by: NURSE PRACTITIONER

## 2021-06-24 PROCEDURE — 1101F PR PT FALLS ASSESS DOC 0-1 FALLS W/OUT INJ PAST YR: ICD-10-PCS | Mod: CPTII,S$GLB,, | Performed by: NURSE PRACTITIONER

## 2021-06-24 PROCEDURE — 82728 ASSAY OF FERRITIN: CPT | Performed by: NURSE PRACTITIONER

## 2021-06-24 PROCEDURE — 99999 PR PBB SHADOW E&M-EST. PATIENT-LVL IV: ICD-10-PCS | Mod: PBBFAC,,, | Performed by: NURSE PRACTITIONER

## 2021-06-24 PROCEDURE — 99214 PR OFFICE/OUTPT VISIT, EST, LEVL IV, 30-39 MIN: ICD-10-PCS | Mod: 25,S$GLB,, | Performed by: NURSE PRACTITIONER

## 2021-06-24 PROCEDURE — 1101F PT FALLS ASSESS-DOCD LE1/YR: CPT | Mod: CPTII,S$GLB,, | Performed by: NURSE PRACTITIONER

## 2021-06-24 PROCEDURE — 80053 COMPREHEN METABOLIC PANEL: CPT | Performed by: NURSE PRACTITIONER

## 2021-06-24 PROCEDURE — 3008F BODY MASS INDEX DOCD: CPT | Mod: CPTII,S$GLB,, | Performed by: NURSE PRACTITIONER

## 2021-06-24 PROCEDURE — 99999 PR PBB SHADOW E&M-EST. PATIENT-LVL IV: CPT | Mod: PBBFAC,,, | Performed by: NURSE PRACTITIONER

## 2021-06-24 PROCEDURE — 1126F PR PAIN SEVERITY QUANTIFIED, NO PAIN PRESENT: ICD-10-PCS | Mod: S$GLB,,, | Performed by: NURSE PRACTITIONER

## 2021-06-24 PROCEDURE — 83036 HEMOGLOBIN GLYCOSYLATED A1C: CPT | Performed by: NURSE PRACTITIONER

## 2021-06-24 PROCEDURE — 1159F MED LIST DOCD IN RCRD: CPT | Mod: S$GLB,,, | Performed by: NURSE PRACTITIONER

## 2021-06-24 PROCEDURE — 3288F FALL RISK ASSESSMENT DOCD: CPT | Mod: CPTII,S$GLB,, | Performed by: NURSE PRACTITIONER

## 2021-06-24 PROCEDURE — 1126F AMNT PAIN NOTED NONE PRSNT: CPT | Mod: S$GLB,,, | Performed by: NURSE PRACTITIONER

## 2021-06-24 PROCEDURE — 83540 ASSAY OF IRON: CPT | Performed by: NURSE PRACTITIONER

## 2021-06-24 PROCEDURE — 99214 OFFICE O/P EST MOD 30 MIN: CPT | Mod: 25,S$GLB,, | Performed by: NURSE PRACTITIONER

## 2021-06-24 PROCEDURE — 3288F PR FALLS RISK ASSESSMENT DOCUMENTED: ICD-10-PCS | Mod: CPTII,S$GLB,, | Performed by: NURSE PRACTITIONER

## 2021-06-24 PROCEDURE — 1159F PR MEDICATION LIST DOCUMENTED IN MEDICAL RECORD: ICD-10-PCS | Mod: S$GLB,,, | Performed by: NURSE PRACTITIONER

## 2021-06-24 RX ORDER — HYDROXYZINE HYDROCHLORIDE 25 MG/1
25 TABLET, FILM COATED ORAL 3 TIMES DAILY PRN
Qty: 20 TABLET | Refills: 0 | Status: SHIPPED | OUTPATIENT
Start: 2021-06-24 | End: 2021-07-27 | Stop reason: SDUPTHER

## 2021-06-24 RX ORDER — BETAMETHASONE SODIUM PHOSPHATE AND BETAMETHASONE ACETATE 3; 3 MG/ML; MG/ML
12 INJECTION, SUSPENSION INTRA-ARTICULAR; INTRALESIONAL; INTRAMUSCULAR; SOFT TISSUE ONCE
Status: COMPLETED | OUTPATIENT
Start: 2021-06-24 | End: 2021-06-24

## 2021-06-24 RX ORDER — TRIAMCINOLONE ACETONIDE 40 MG/ML
40 INJECTION, SUSPENSION INTRA-ARTICULAR; INTRAMUSCULAR ONCE
Status: CANCELLED | OUTPATIENT
Start: 2021-06-24 | End: 2021-06-24

## 2021-06-24 RX ADMIN — BETAMETHASONE SODIUM PHOSPHATE AND BETAMETHASONE ACETATE 12 MG: 3; 3 INJECTION, SUSPENSION INTRA-ARTICULAR; INTRALESIONAL; INTRAMUSCULAR; SOFT TISSUE at 09:06

## 2021-07-14 ENCOUNTER — TELEPHONE (OUTPATIENT)
Dept: INTERNAL MEDICINE | Facility: CLINIC | Age: 66
End: 2021-07-14

## 2021-07-14 DIAGNOSIS — Z12.31 ENCOUNTER FOR SCREENING MAMMOGRAM FOR BREAST CANCER: Primary | ICD-10-CM

## 2021-07-20 ENCOUNTER — PATIENT OUTREACH (OUTPATIENT)
Dept: ADMINISTRATIVE | Facility: OTHER | Age: 66
End: 2021-07-20

## 2021-07-22 ENCOUNTER — HOSPITAL ENCOUNTER (OUTPATIENT)
Dept: RADIOLOGY | Facility: HOSPITAL | Age: 66
Discharge: HOME | End: 2021-07-22
Attending: INTERNAL MEDICINE
Payer: COMMERCIAL

## 2021-07-22 ENCOUNTER — OFFICE VISIT (OUTPATIENT)
Dept: ALLERGY | Facility: CLINIC | Age: 66
End: 2021-07-22
Payer: COMMERCIAL

## 2021-07-22 VITALS — BODY MASS INDEX: 32.43 KG/M2 | HEIGHT: 63 IN | WEIGHT: 183 LBS

## 2021-07-22 VITALS
SYSTOLIC BLOOD PRESSURE: 128 MMHG | BODY MASS INDEX: 32.58 KG/M2 | WEIGHT: 183.88 LBS | HEIGHT: 63 IN | DIASTOLIC BLOOD PRESSURE: 88 MMHG

## 2021-07-22 DIAGNOSIS — E55.9 VITAMIN D DEFICIENCY: ICD-10-CM

## 2021-07-22 DIAGNOSIS — E79.0 HYPERURICEMIA: ICD-10-CM

## 2021-07-22 DIAGNOSIS — Z12.31 ENCOUNTER FOR SCREENING MAMMOGRAM FOR BREAST CANCER: ICD-10-CM

## 2021-07-22 DIAGNOSIS — L29.9 PRURITUS: Primary | ICD-10-CM

## 2021-07-22 DIAGNOSIS — J31.0 CHRONIC RHINITIS: ICD-10-CM

## 2021-07-22 DIAGNOSIS — H10.403 CHRONIC CONJUNCTIVITIS OF BOTH EYES, UNSPECIFIED CHRONIC CONJUNCTIVITIS TYPE: ICD-10-CM

## 2021-07-22 DIAGNOSIS — I10 ESSENTIAL HYPERTENSION: Chronic | ICD-10-CM

## 2021-07-22 DIAGNOSIS — G47.33 OBSTRUCTIVE SLEEP APNEA ON CPAP: ICD-10-CM

## 2021-07-22 DIAGNOSIS — H40.1120 PRIMARY OPEN ANGLE GLAUCOMA OF LEFT EYE, UNSPECIFIED GLAUCOMA STAGE: Chronic | ICD-10-CM

## 2021-07-22 PROCEDURE — 77063 MAMMO DIGITAL SCREENING BILAT WITH TOMO: ICD-10-PCS | Mod: 26,,, | Performed by: RADIOLOGY

## 2021-07-22 PROCEDURE — 1126F PR PAIN SEVERITY QUANTIFIED, NO PAIN PRESENT: ICD-10-PCS | Mod: CPTII,S$GLB,, | Performed by: ALLERGY & IMMUNOLOGY

## 2021-07-22 PROCEDURE — 77067 MAMMO DIGITAL SCREENING BILAT WITH TOMO: ICD-10-PCS | Mod: 26,,, | Performed by: RADIOLOGY

## 2021-07-22 PROCEDURE — 1126F AMNT PAIN NOTED NONE PRSNT: CPT | Mod: CPTII,S$GLB,, | Performed by: ALLERGY & IMMUNOLOGY

## 2021-07-22 PROCEDURE — 3008F BODY MASS INDEX DOCD: CPT | Mod: CPTII,S$GLB,, | Performed by: ALLERGY & IMMUNOLOGY

## 2021-07-22 PROCEDURE — 77067 SCR MAMMO BI INCL CAD: CPT | Mod: 26,,, | Performed by: RADIOLOGY

## 2021-07-22 PROCEDURE — 77063 BREAST TOMOSYNTHESIS BI: CPT | Mod: 26,,, | Performed by: RADIOLOGY

## 2021-07-22 PROCEDURE — 99204 PR OFFICE/OUTPT VISIT, NEW, LEVL IV, 45-59 MIN: ICD-10-PCS | Mod: S$GLB,,, | Performed by: ALLERGY & IMMUNOLOGY

## 2021-07-22 PROCEDURE — 3079F PR MOST RECENT DIASTOLIC BLOOD PRESSURE 80-89 MM HG: ICD-10-PCS | Mod: CPTII,S$GLB,, | Performed by: ALLERGY & IMMUNOLOGY

## 2021-07-22 PROCEDURE — 3079F DIAST BP 80-89 MM HG: CPT | Mod: CPTII,S$GLB,, | Performed by: ALLERGY & IMMUNOLOGY

## 2021-07-22 PROCEDURE — 99999 PR PBB SHADOW E&M-EST. PATIENT-LVL IV: CPT | Mod: PBBFAC,,, | Performed by: ALLERGY & IMMUNOLOGY

## 2021-07-22 PROCEDURE — 99204 OFFICE O/P NEW MOD 45 MIN: CPT | Mod: S$GLB,,, | Performed by: ALLERGY & IMMUNOLOGY

## 2021-07-22 PROCEDURE — 99999 PR PBB SHADOW E&M-EST. PATIENT-LVL IV: ICD-10-PCS | Mod: PBBFAC,,, | Performed by: ALLERGY & IMMUNOLOGY

## 2021-07-22 PROCEDURE — 3074F PR MOST RECENT SYSTOLIC BLOOD PRESSURE < 130 MM HG: ICD-10-PCS | Mod: CPTII,S$GLB,, | Performed by: ALLERGY & IMMUNOLOGY

## 2021-07-22 PROCEDURE — 3008F PR BODY MASS INDEX (BMI) DOCUMENTED: ICD-10-PCS | Mod: CPTII,S$GLB,, | Performed by: ALLERGY & IMMUNOLOGY

## 2021-07-22 PROCEDURE — 3074F SYST BP LT 130 MM HG: CPT | Mod: CPTII,S$GLB,, | Performed by: ALLERGY & IMMUNOLOGY

## 2021-07-22 PROCEDURE — 77067 SCR MAMMO BI INCL CAD: CPT | Mod: TC

## 2021-07-26 ENCOUNTER — OFFICE VISIT (OUTPATIENT)
Dept: ALLERGY | Facility: CLINIC | Age: 66
End: 2021-07-26
Payer: COMMERCIAL

## 2021-07-26 ENCOUNTER — TELEPHONE (OUTPATIENT)
Dept: INTERNAL MEDICINE | Facility: CLINIC | Age: 66
End: 2021-07-26

## 2021-07-26 VITALS — BODY MASS INDEX: 32.54 KG/M2 | HEIGHT: 63 IN | WEIGHT: 183.63 LBS

## 2021-07-26 DIAGNOSIS — E55.9 VITAMIN D DEFICIENCY: ICD-10-CM

## 2021-07-26 DIAGNOSIS — E79.0 HYPERURICEMIA: ICD-10-CM

## 2021-07-26 DIAGNOSIS — I10 ESSENTIAL HYPERTENSION: Chronic | ICD-10-CM

## 2021-07-26 DIAGNOSIS — H10.403 CHRONIC CONJUNCTIVITIS OF BOTH EYES, UNSPECIFIED CHRONIC CONJUNCTIVITIS TYPE: ICD-10-CM

## 2021-07-26 DIAGNOSIS — J31.0 CHRONIC RHINITIS: ICD-10-CM

## 2021-07-26 DIAGNOSIS — L29.9 ITCHING: Primary | ICD-10-CM

## 2021-07-26 PROCEDURE — 3008F PR BODY MASS INDEX (BMI) DOCUMENTED: ICD-10-PCS | Mod: CPTII,S$GLB,, | Performed by: ALLERGY & IMMUNOLOGY

## 2021-07-26 PROCEDURE — 99214 PR OFFICE/OUTPT VISIT, EST, LEVL IV, 30-39 MIN: ICD-10-PCS | Mod: S$GLB,,, | Performed by: ALLERGY & IMMUNOLOGY

## 2021-07-26 PROCEDURE — 1159F PR MEDICATION LIST DOCUMENTED IN MEDICAL RECORD: ICD-10-PCS | Mod: CPTII,S$GLB,, | Performed by: ALLERGY & IMMUNOLOGY

## 2021-07-26 PROCEDURE — 1126F PR PAIN SEVERITY QUANTIFIED, NO PAIN PRESENT: ICD-10-PCS | Mod: CPTII,S$GLB,, | Performed by: ALLERGY & IMMUNOLOGY

## 2021-07-26 PROCEDURE — 1126F AMNT PAIN NOTED NONE PRSNT: CPT | Mod: CPTII,S$GLB,, | Performed by: ALLERGY & IMMUNOLOGY

## 2021-07-26 PROCEDURE — 1160F RVW MEDS BY RX/DR IN RCRD: CPT | Mod: CPTII,S$GLB,, | Performed by: ALLERGY & IMMUNOLOGY

## 2021-07-26 PROCEDURE — 3008F BODY MASS INDEX DOCD: CPT | Mod: CPTII,S$GLB,, | Performed by: ALLERGY & IMMUNOLOGY

## 2021-07-26 PROCEDURE — 1160F PR REVIEW ALL MEDS BY PRESCRIBER/CLIN PHARMACIST DOCUMENTED: ICD-10-PCS | Mod: CPTII,S$GLB,, | Performed by: ALLERGY & IMMUNOLOGY

## 2021-07-26 PROCEDURE — 99214 OFFICE O/P EST MOD 30 MIN: CPT | Mod: S$GLB,,, | Performed by: ALLERGY & IMMUNOLOGY

## 2021-07-26 PROCEDURE — 99999 PR PBB SHADOW E&M-EST. PATIENT-LVL III: ICD-10-PCS | Mod: PBBFAC,,, | Performed by: ALLERGY & IMMUNOLOGY

## 2021-07-26 PROCEDURE — 1159F MED LIST DOCD IN RCRD: CPT | Mod: CPTII,S$GLB,, | Performed by: ALLERGY & IMMUNOLOGY

## 2021-07-26 PROCEDURE — 99999 PR PBB SHADOW E&M-EST. PATIENT-LVL III: CPT | Mod: PBBFAC,,, | Performed by: ALLERGY & IMMUNOLOGY

## 2021-07-27 RX ORDER — HYDROXYZINE HYDROCHLORIDE 25 MG/1
25 TABLET, FILM COATED ORAL 3 TIMES DAILY PRN
Qty: 60 TABLET | Refills: 1 | Status: SHIPPED | OUTPATIENT
Start: 2021-07-27 | End: 2021-12-23

## 2021-08-27 ENCOUNTER — TELEPHONE (OUTPATIENT)
Dept: INTERNAL MEDICINE | Facility: CLINIC | Age: 66
End: 2021-08-27

## 2021-08-27 DIAGNOSIS — M10.9 PODAGRA: ICD-10-CM

## 2021-08-27 DIAGNOSIS — E55.9 VITAMIN D DEFICIENCY: ICD-10-CM

## 2021-08-27 DIAGNOSIS — E79.0 HYPERURICEMIA: ICD-10-CM

## 2021-08-27 RX ORDER — ERGOCALCIFEROL 1.25 MG/1
50000 CAPSULE ORAL
Qty: 12 CAPSULE | Refills: 0 | Status: SHIPPED | OUTPATIENT
Start: 2021-08-27 | End: 2021-12-07 | Stop reason: SDUPTHER

## 2021-08-27 RX ORDER — ALLOPURINOL 100 MG/1
100 TABLET ORAL DAILY
Qty: 90 TABLET | Refills: 0 | Status: SHIPPED | OUTPATIENT
Start: 2021-08-27 | End: 2021-12-21 | Stop reason: SDUPTHER

## 2021-11-26 ENCOUNTER — IMMUNIZATION (OUTPATIENT)
Dept: INTERNAL MEDICINE | Facility: CLINIC | Age: 66
End: 2021-11-26
Payer: COMMERCIAL

## 2021-11-26 DIAGNOSIS — Z23 NEED FOR VACCINATION: Primary | ICD-10-CM

## 2021-11-26 PROCEDURE — 0064A COVID-19, MRNA, LNP-S, PF, 100 MCG/0.25 ML DOSE VACCINE (MODERNA BOOSTER): CPT | Mod: CV19,PBBFAC | Performed by: INTERNAL MEDICINE

## 2021-12-06 DIAGNOSIS — I10 ESSENTIAL HYPERTENSION: Chronic | ICD-10-CM

## 2021-12-06 RX ORDER — ATENOLOL 50 MG/1
50 TABLET ORAL DAILY
Qty: 90 TABLET | Refills: 3 | Status: SHIPPED | OUTPATIENT
Start: 2021-12-06 | End: 2021-12-21 | Stop reason: SDUPTHER

## 2021-12-07 DIAGNOSIS — E55.9 VITAMIN D DEFICIENCY: ICD-10-CM

## 2021-12-07 RX ORDER — ERGOCALCIFEROL 1.25 MG/1
50000 CAPSULE ORAL
Qty: 12 CAPSULE | Refills: 1 | Status: SHIPPED | OUTPATIENT
Start: 2021-12-07 | End: 2021-12-21 | Stop reason: SDUPTHER

## 2021-12-21 ENCOUNTER — OFFICE VISIT (OUTPATIENT)
Dept: INTERNAL MEDICINE | Facility: CLINIC | Age: 66
End: 2021-12-21
Payer: COMMERCIAL

## 2021-12-21 VITALS
SYSTOLIC BLOOD PRESSURE: 125 MMHG | HEIGHT: 63 IN | HEART RATE: 70 BPM | BODY MASS INDEX: 31.33 KG/M2 | WEIGHT: 176.81 LBS | OXYGEN SATURATION: 99 % | DIASTOLIC BLOOD PRESSURE: 75 MMHG

## 2021-12-21 DIAGNOSIS — J30.1 SEASONAL ALLERGIC RHINITIS DUE TO POLLEN: Chronic | ICD-10-CM

## 2021-12-21 DIAGNOSIS — I10 ESSENTIAL HYPERTENSION: Primary | Chronic | ICD-10-CM

## 2021-12-21 DIAGNOSIS — M10.9 PODAGRA: ICD-10-CM

## 2021-12-21 DIAGNOSIS — G47.33 OBSTRUCTIVE SLEEP APNEA ON CPAP: ICD-10-CM

## 2021-12-21 DIAGNOSIS — H40.1131 PRIMARY OPEN ANGLE GLAUCOMA (POAG) OF BOTH EYES, MILD STAGE: Chronic | ICD-10-CM

## 2021-12-21 DIAGNOSIS — Z28.82 VACCINE REFUSED BY PARENT: ICD-10-CM

## 2021-12-21 DIAGNOSIS — E79.0 HYPERURICEMIA: ICD-10-CM

## 2021-12-21 DIAGNOSIS — D12.6 TUBULAR ADENOMA OF COLON: ICD-10-CM

## 2021-12-21 DIAGNOSIS — E55.9 VITAMIN D DEFICIENCY: ICD-10-CM

## 2021-12-21 PROBLEM — R73.03 PREDIABETES: Status: RESOLVED | Noted: 2020-08-01 | Resolved: 2021-12-21

## 2021-12-21 PROCEDURE — 99999 PR PBB SHADOW E&M-EST. PATIENT-LVL III: ICD-10-PCS | Mod: PBBFAC,,, | Performed by: INTERNAL MEDICINE

## 2021-12-21 PROCEDURE — 99999 PR PBB SHADOW E&M-EST. PATIENT-LVL III: CPT | Mod: PBBFAC,,, | Performed by: INTERNAL MEDICINE

## 2021-12-21 PROCEDURE — 99214 PR OFFICE/OUTPT VISIT, EST, LEVL IV, 30-39 MIN: ICD-10-PCS | Mod: S$GLB,,, | Performed by: INTERNAL MEDICINE

## 2021-12-21 PROCEDURE — 99214 OFFICE O/P EST MOD 30 MIN: CPT | Mod: S$GLB,,, | Performed by: INTERNAL MEDICINE

## 2021-12-21 RX ORDER — ALLOPURINOL 100 MG/1
100 TABLET ORAL DAILY
Qty: 90 TABLET | Refills: 3 | Status: SHIPPED | OUTPATIENT
Start: 2021-12-21 | End: 2022-06-17 | Stop reason: SDUPTHER

## 2021-12-21 RX ORDER — ATENOLOL 50 MG/1
50 TABLET ORAL DAILY
Qty: 90 TABLET | Refills: 3 | Status: SHIPPED | OUTPATIENT
Start: 2021-12-21 | End: 2022-06-17 | Stop reason: SDUPTHER

## 2021-12-21 RX ORDER — ERGOCALCIFEROL 1.25 MG/1
50000 CAPSULE ORAL
Qty: 12 CAPSULE | Refills: 3 | Status: SHIPPED | OUTPATIENT
Start: 2021-12-21 | End: 2022-06-17 | Stop reason: SDUPTHER

## 2021-12-21 RX ORDER — SPIRONOLACTONE 25 MG/1
25 TABLET ORAL DAILY
Qty: 90 TABLET | Refills: 3 | Status: SHIPPED | OUTPATIENT
Start: 2021-12-21 | End: 2022-06-17 | Stop reason: SDUPTHER

## 2022-04-18 ENCOUNTER — OFFICE VISIT (OUTPATIENT)
Dept: INTERNAL MEDICINE | Facility: CLINIC | Age: 67
End: 2022-04-18
Payer: COMMERCIAL

## 2022-04-18 VITALS
OXYGEN SATURATION: 99 % | HEIGHT: 63 IN | WEIGHT: 179.44 LBS | BODY MASS INDEX: 31.79 KG/M2 | HEART RATE: 67 BPM | SYSTOLIC BLOOD PRESSURE: 138 MMHG | DIASTOLIC BLOOD PRESSURE: 88 MMHG

## 2022-04-18 DIAGNOSIS — L25.9 ACUTE CONTACT DERMATITIS: Primary | ICD-10-CM

## 2022-04-18 PROCEDURE — 99999 PR PBB SHADOW E&M-EST. PATIENT-LVL IV: CPT | Mod: PBBFAC,,, | Performed by: NURSE PRACTITIONER

## 2022-04-18 PROCEDURE — 3008F PR BODY MASS INDEX (BMI) DOCUMENTED: ICD-10-PCS | Mod: CPTII,S$GLB,, | Performed by: NURSE PRACTITIONER

## 2022-04-18 PROCEDURE — 3075F SYST BP GE 130 - 139MM HG: CPT | Mod: CPTII,S$GLB,, | Performed by: NURSE PRACTITIONER

## 2022-04-18 PROCEDURE — 3288F FALL RISK ASSESSMENT DOCD: CPT | Mod: CPTII,S$GLB,, | Performed by: NURSE PRACTITIONER

## 2022-04-18 PROCEDURE — 3079F PR MOST RECENT DIASTOLIC BLOOD PRESSURE 80-89 MM HG: ICD-10-PCS | Mod: CPTII,S$GLB,, | Performed by: NURSE PRACTITIONER

## 2022-04-18 PROCEDURE — 3008F BODY MASS INDEX DOCD: CPT | Mod: CPTII,S$GLB,, | Performed by: NURSE PRACTITIONER

## 2022-04-18 PROCEDURE — 1101F PT FALLS ASSESS-DOCD LE1/YR: CPT | Mod: CPTII,S$GLB,, | Performed by: NURSE PRACTITIONER

## 2022-04-18 PROCEDURE — 1126F PR PAIN SEVERITY QUANTIFIED, NO PAIN PRESENT: ICD-10-PCS | Mod: CPTII,S$GLB,, | Performed by: NURSE PRACTITIONER

## 2022-04-18 PROCEDURE — 1101F PR PT FALLS ASSESS DOC 0-1 FALLS W/OUT INJ PAST YR: ICD-10-PCS | Mod: CPTII,S$GLB,, | Performed by: NURSE PRACTITIONER

## 2022-04-18 PROCEDURE — 99213 PR OFFICE/OUTPT VISIT, EST, LEVL III, 20-29 MIN: ICD-10-PCS | Mod: S$GLB,,, | Performed by: NURSE PRACTITIONER

## 2022-04-18 PROCEDURE — 1159F MED LIST DOCD IN RCRD: CPT | Mod: CPTII,S$GLB,, | Performed by: NURSE PRACTITIONER

## 2022-04-18 PROCEDURE — 1126F AMNT PAIN NOTED NONE PRSNT: CPT | Mod: CPTII,S$GLB,, | Performed by: NURSE PRACTITIONER

## 2022-04-18 PROCEDURE — 1159F PR MEDICATION LIST DOCUMENTED IN MEDICAL RECORD: ICD-10-PCS | Mod: CPTII,S$GLB,, | Performed by: NURSE PRACTITIONER

## 2022-04-18 PROCEDURE — 3075F PR MOST RECENT SYSTOLIC BLOOD PRESS GE 130-139MM HG: ICD-10-PCS | Mod: CPTII,S$GLB,, | Performed by: NURSE PRACTITIONER

## 2022-04-18 PROCEDURE — 99213 OFFICE O/P EST LOW 20 MIN: CPT | Mod: S$GLB,,, | Performed by: NURSE PRACTITIONER

## 2022-04-18 PROCEDURE — 3288F PR FALLS RISK ASSESSMENT DOCUMENTED: ICD-10-PCS | Mod: CPTII,S$GLB,, | Performed by: NURSE PRACTITIONER

## 2022-04-18 PROCEDURE — 3079F DIAST BP 80-89 MM HG: CPT | Mod: CPTII,S$GLB,, | Performed by: NURSE PRACTITIONER

## 2022-04-18 PROCEDURE — 99999 PR PBB SHADOW E&M-EST. PATIENT-LVL IV: ICD-10-PCS | Mod: PBBFAC,,, | Performed by: NURSE PRACTITIONER

## 2022-04-18 RX ORDER — TIMOLOL MALEATE 5 MG/ML
1 SOLUTION/ DROPS OPHTHALMIC EVERY MORNING
COMMUNITY
Start: 2022-04-05 | End: 2023-03-02

## 2022-04-18 NOTE — PROGRESS NOTES
Subjective:       Patient ID: Razia Keyes is a 67 y.o. female.    Chief Complaint: Eczema    Patient who is new to me for possible eczema. Patient reports a bilateral wrist rash that started about two weeks ago. She has been treating it with neosporin, vasoline, and moisturizer. The rash is improving. She uses dial soap daily and has not changed her laundry detergent. She reports that she uses bracelets.     Eczema  This is a new problem. The current episode started 1 to 4 weeks ago. The problem occurs daily. The problem has been rapidly improving. Associated symptoms include a rash. Pertinent negatives include no abdominal pain, anorexia, arthralgias, change in bowel habit, chest pain, congestion, coughing, diaphoresis, fever, headaches, nausea, numbness, urinary symptoms, vertigo, vomiting or weakness. Nothing aggravates the symptoms. The treatment provided moderate relief.     Review of Systems   Constitutional: Negative for diaphoresis and fever.   HENT: Negative for congestion.    Respiratory: Negative for cough.    Cardiovascular: Negative for chest pain.   Gastrointestinal: Negative for abdominal pain, anorexia, change in bowel habit, nausea and vomiting.   Musculoskeletal: Negative for arthralgias.   Skin: Positive for rash.   Neurological: Negative for vertigo, weakness, numbness and headaches.       Objective:      Physical Exam  Vitals and nursing note reviewed.   Constitutional:       Appearance: She is well-developed.   HENT:      Head: Normocephalic and atraumatic.      Right Ear: External ear normal.      Left Ear: External ear normal.      Nose: Nose normal.   Eyes:      Pupils: Pupils are equal, round, and reactive to light.   Cardiovascular:      Rate and Rhythm: Normal rate and regular rhythm.      Heart sounds: Normal heart sounds.   Pulmonary:      Effort: Pulmonary effort is normal.      Breath sounds: Normal breath sounds.   Abdominal:      General: Bowel sounds are normal.      Palpations:  Abdomen is soft.   Musculoskeletal:         General: Normal range of motion.      Cervical back: Normal range of motion.   Skin:     General: Skin is warm and dry.          Neurological:      Mental Status: She is alert and oriented to person, place, and time.                 Assessment:       1. Acute contact dermatitis        Plan:       Razia was seen today for eczema.    Diagnoses and all orders for this visit:    Acute contact dermatitis      Discussed avoiding harsh soaps like dial soap and start using moisturizing soap without fragrance. Continue to use moisturizer to bilateral arms. Handout given on dermatitis. Discussed worsening signs/symptoms and when to return to clinic or go to ED.   Patient expresses understanding and agrees with treatment plan.

## 2022-06-16 NOTE — PROGRESS NOTES
INTERNAL MEDICINE CLINIC  Follow-up Visit Progress Note     PRESENTING HISTORY     PCP: Shaq Sheppard MD    Last Clinic Visit with me:  12-    Current Chief Complaint/Problem:  Annual    History of Present Illness & ROS: Ms. Razia Keyes is a 67 y.o. female.    Still has itching on bilateral arms.     Review of Systems:  .Review of Systems   Constitutional: Negative for chills and fever.   Respiratory: Negative for shortness of breath.    Cardiovascular: Negative for chest pain.   Gastrointestinal: Negative for abdominal pain and blood in stool.   Genitourinary: Negative for hematuria.   Musculoskeletal: Negative for joint pain.   Skin: Positive for rash (arms).   Neurological: Negative for headaches.   Psychiatric/Behavioral: Negative for depression.       PAST HISTORY:     Past Medical History:   Diagnosis Date    BMI 33.0-33.9,adult 1/7/2021    Elevated HDL 02/03/2015    Essential hypertension 11/27/2012    Hyperuricemia 1/7/2021    Obstructive sleep apnea on CPAP     Podagra 1/7/2021    Prediabetes 8/1/2020    Mild.Diet control.    Primary open angle glaucoma (POAG) 10/17/2018    Seasonal allergic rhinitis due to pollen 10/17/2018    Tubular adenoma of colon (cecum) 12/3/2018    Last C-scope .  Next in 11- 2023    Vaccine refused by parent 8/6/2019    Vitamin D deficiency 10/17/2018       Past Surgical History:   Procedure Laterality Date    COLONOSCOPY  2007    Normal 2007. She should return in 10 years    COLONOSCOPY N/A 11/21/2018    Procedure: COLONOSCOPY;  Surgeon: Brody August MD;  Location: [address]);  Service: Endoscopy;  Laterality: N/A;    HYSTERECTOMY      Age 27       Family History   Problem Relation Age of Onset    Lymphoma Father     Diabetes Mother     Dementia Mother     Anesthesia problems Maternal Uncle     Sleep apnea Brother     Gout Brother     Allergic rhinitis Neg Hx     Allergies Neg Hx     Angioedema Neg Hx     Asthma Neg Hx      Atopy Neg Hx     Eczema Neg Hx     Immunodeficiency Neg Hx     Rhinitis Neg Hx     Urticaria Neg Hx        Social History     Socioeconomic History    Marital status: Single    Number of children: 0   Occupational History     Employer: [employer]   Tobacco Use    Smoking status: Never Smoker    Smokeless tobacco: Never Used    Tobacco comment: 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 She exercises by walking about at school and in the stairways.   Substance and Sexual Activity    Alcohol use: Yes     Comment: Social    Drug use: No    Sexual activity: Not Currently     Partners: Male   Social History Narrative    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      Social Determinants of Health     Financial Resource Strain: Low Risk     Difficulty of Paying Living Expenses: Not hard at all   Food Insecurity: No Food Insecurity    Worried About Running Out of Food in the Last Year: Never true    Ran Out of Food in the Last Year: Never true   Transportation Needs: No Transportation Needs    Lack of Transportation (Medical): No    Lack of Transportation (Non-Medical): No   Physical Activity: Insufficiently Active    Days of Exercise per Week: 2 days    Minutes of Exercise per Session: 60 min   Stress: No Stress Concern Present    Feeling of Stress : Only a little   Social Connections: Unknown    Frequency of Communication with Friends and Family: More than three times a week    Frequency of Social Gatherings with Friends and Family: Never    Active Member of Clubs or Organizations: Yes    Attends Club or Organization Meetings: Patient refused    Marital Status: Never    Housing Stability: Unknown    Unable to Pay for Housing in the Last Year: Patient refused    Number of Places Lived in the Last Year: 0    Unstable Housing in the Last Year: No       MEDICATIONS & ALLERGIES:     Current Outpatient  Medications on File Prior to Visit   Medication Sig Dispense Refill    latanoprost 0.005 % ophthalmic solution Place 1 drop into both eyes every evening.      timolol maleate 0.5% (TIMOPTIC) 0.5 % Drop Place 1 drop into both eyes every morning.      [ allopurinoL (ZYLOPRIM) 100 MG tablet Take 1 tablet (100 mg total) by mouth once daily. 90 tablet 3     atenoloL (TENORMIN) 50 MG tablet Take 1 tablet (50 mg total) by mouth once daily. 90 tablet 3     ergocalciferol (VITAMIN D2) 50,000 unit Cap Take 1 capsule (50,000 Units total) by mouth every 7 days. 12 capsule 3     hydrOXYzine HCL (ATARAX) 25 MG tablet TAKE 1 TABLET BY MOUTH THREE TIMES A DAY AS NEEDED FOR ITCHING 60 tablet 1     spironolactone (ALDACTONE) 25 MG tablet Take 1 tablet (25 mg total) by mouth once daily. 90 tablet 3     No current facility-administered medications on file prior to visit.        Review of patient's allergies indicates:  No Known Allergies    Medications Reconciliation:   I have reconciled the patient's home medications and discharge medications with the patient/family. I have updated all changes.  Refer to After-Visit Medication List.    OBJECTIVE:     Vital Signs:  Vitals:    06/17/22 0819   BP: 124/76   Pulse: 68     Wt Readings from Last 3 Encounters:   06/17/22 0819 81.8 kg (180 lb 5.4 oz)   04/18/22 1809 81.4 kg (179 lb 7.3 oz)   12/21/21 0830 80.2 kg (176 lb 12.9 oz)     Body mass index is 31.95 kg/m².     Physical Exam:  General: Well developed, well nourished. No distress.  HEENT: Head is normocephalic, atraumatic; ears are normal.    Eyes: Clear conjunctiva.  Neck: Supple, symmetrical neck; trachea midline.  Lungs: Clear to auscultation bilaterally and normal respiratory effort.  Cardiovascular: Heart with regular rate and rhythm.    Extremities: No LE edema. Pulses 2+ and symmetric.   Abdomen: Abdomen is soft, non-tender non-distended with normal bowel sounds.  Skin: Skin color, texture, turgor normal.     Musculoskeletal: Normal gait.   Lymph Nodes: No cervical or supraclavicular adenopathy.  Neurologic: Normal strength and tone. No focal numbness or weakness.   Psychiatric: Normal affect. Alert.    Laboratory  Lab Results   Component Value Date    WBC 5.22 08/01/2020    HGB 13.7 08/01/2020    HCT 42.3 08/01/2020     (H) 08/01/2020    CHOL 172 08/01/2020    TRIG 59 08/01/2020    HDL 65 08/01/2020    ALT 12 06/24/2021    AST 66 (H) 06/24/2021     06/24/2021    K 4.7 06/24/2021     06/24/2021    CREATININE 1.2 06/24/2021    BUN 15 06/24/2021    CO2 25 06/24/2021    TSH 2.099 07/22/2021    HGBA1C 5.6 06/24/2021       ASSESSMENT & PLAN:     Annual physical exam  - Reviewed and updated past and current medical problems.  Discussed treatment of current medical problems    -     Lipid Panel; Future; Expected date: 06/17/2022  -     CBC Auto Differential; Future; Expected date: 06/17/2022  -     Comprehensive Metabolic Panel; Future; Expected date: 06/17/2022  -     TSH; Future; Expected date: 06/17/2022  -     Hemoglobin A1C; Future; Expected date: 06/17/2022  -     Vitamin D; Future; Expected date: 12/14/2022  -     Uric Acid; Future; Expected date: 06/17/2022    Essential hypertension  - Tenoretic was stopped due to Gout (thiazide diuretic) and low potassium.     Now well controlled on:     -     spironolactone (ALDACTONE) 25 MG tablet; Take 1 tablet (25 mg total) by mouth once daily.  Dispense: 90 tablet; Refill: 3  -     atenoloL (TENORMIN) 50 MG tablet; Take 1 tablet (50 mg total) by mouth once daily.  Dispense: 90 tablet; Refill: 3     Podagra  Hyperuricemia  -     Uric acid (8-1-20) 9.7. Decreased to 4 (7/22/21)        On Allopurinol 100 mg daily since then.         No gout attack since.     Seasonal allergic rhinitis due to pollen  - On PRN Xyzal.     Primary open angle glaucoma (POAG) of both eyes, mild stage   She is followed by outside eye doctor   On  travoprost / lantanoprost (one of them  depending cost)     Vitamin D deficiency  - On Vitamin D2 50,000 weekly.  -     ergocalciferol (VITAMIN D2) 50,000 unit Cap; Take 1 capsule (50,000 Units total) by mouth every 7 days.       Tubular adenoma of colon (cecum)  Last C-scope .  Next in 11- 2023     ZENAIDA (obstructive sleep apnea) - on CPAP.  - On CPAP doing well.     Vaccine refused by parent  - Patient does not want any vaccine.     BMI 33.0-33.9,adult  .Body mass index is 31.32 kg/m².  - Trying to lose weight on her own.     Other eczema  -     clobetasoL (TEMOVATE) 0.05 % cream; Apply topically 2 (two) times daily.  Dispense: 60 g; Refill: 11    Preventive Health Maintenance:  Refused Tdap, Shingle vaccine, Pneumovax and Flu vaccine.        [facility]     Encounter for screening mammogram for breast cancer  -     Mammo Digital Screening Bilat; Future; Expected date: 06/17/2022    Return to Clinic for Follow Up with me:   1 Year. (Call if BP high at home)    Scheduled Follow-up :  No future appointments.    After Visit Medication List :     Medication List          Accurate as of June 17, 2022  8:40 AM. If you have any questions, ask your nurse or doctor.            START taking these medications    clobetasoL 0.05 % cream  Commonly known as: TEMOVATE  Apply topically 2 (two) times daily.  Started by: Shaq Sheppard MD        CONTINUE taking these medications    allopurinoL 100 MG tablet  Commonly known as: ZYLOPRIM  Take 1 tablet (100 mg total) by mouth once daily.     atenoloL 50 MG tablet  Commonly known as: TENORMIN  Take 1 tablet (50 mg total) by mouth once daily.     ergocalciferol 50,000 unit Cap  Commonly known as: VITAMIN D2  Take 1 capsule (50,000 Units total) by mouth every 7 days.     latanoprost 0.005 % ophthalmic solution     spironolactone 25 MG tablet  Commonly known as: ALDACTONE  Take 1 tablet (25 mg total) by mouth once daily.     timolol maleate 0.5% 0.5 % Drop  Commonly known as: TIMOPTIC         STOP taking these medications    hydrOXYzine HCL 25 MG tablet  Commonly known as: ATARAX  Stopped by: Shaq Sheppard MD           Where to Get Your Medications      These medications were sent to [facility]/pharmacy #[id number] - [place], [place] - [address]  [address] [place] [address]    Phone: [phone]   · allopurinoL 100 MG tablet  · atenoloL 50 MG tablet  · clobetasoL 0.05 % cream  · ergocalciferol 50,000 unit Cap  · spironolactone 25 MG tablet         Signing Physician:  Shaq Sheppard MD

## 2022-06-17 ENCOUNTER — LAB VISIT (OUTPATIENT)
Dept: LAB | Facility: HOSPITAL | Age: 67
End: 2022-06-17
Attending: INTERNAL MEDICINE
Payer: COMMERCIAL

## 2022-06-17 ENCOUNTER — OFFICE VISIT (OUTPATIENT)
Dept: INTERNAL MEDICINE | Facility: CLINIC | Age: 67
End: 2022-06-17
Payer: COMMERCIAL

## 2022-06-17 VITALS
DIASTOLIC BLOOD PRESSURE: 76 MMHG | BODY MASS INDEX: 31.95 KG/M2 | OXYGEN SATURATION: 98 % | SYSTOLIC BLOOD PRESSURE: 124 MMHG | HEIGHT: 63 IN | WEIGHT: 180.31 LBS | HEART RATE: 68 BPM

## 2022-06-17 DIAGNOSIS — L30.8 OTHER ECZEMA: ICD-10-CM

## 2022-06-17 DIAGNOSIS — D12.6 TUBULAR ADENOMA OF COLON: ICD-10-CM

## 2022-06-17 DIAGNOSIS — Z00.00 ANNUAL PHYSICAL EXAM: ICD-10-CM

## 2022-06-17 DIAGNOSIS — Z12.31 ENCOUNTER FOR SCREENING MAMMOGRAM FOR BREAST CANCER: ICD-10-CM

## 2022-06-17 DIAGNOSIS — Z00.00 ANNUAL PHYSICAL EXAM: Primary | ICD-10-CM

## 2022-06-17 DIAGNOSIS — Z28.82 VACCINE REFUSED BY PARENT: ICD-10-CM

## 2022-06-17 DIAGNOSIS — E79.0 HYPERURICEMIA: ICD-10-CM

## 2022-06-17 DIAGNOSIS — E55.9 VITAMIN D DEFICIENCY: ICD-10-CM

## 2022-06-17 DIAGNOSIS — M10.9 PODAGRA: ICD-10-CM

## 2022-06-17 DIAGNOSIS — G47.33 OBSTRUCTIVE SLEEP APNEA ON CPAP: ICD-10-CM

## 2022-06-17 DIAGNOSIS — I10 ESSENTIAL HYPERTENSION: Chronic | ICD-10-CM

## 2022-06-17 DIAGNOSIS — H40.1131 PRIMARY OPEN ANGLE GLAUCOMA (POAG) OF BOTH EYES, MILD STAGE: Chronic | ICD-10-CM

## 2022-06-17 DIAGNOSIS — J30.1 SEASONAL ALLERGIC RHINITIS DUE TO POLLEN: Chronic | ICD-10-CM

## 2022-06-17 LAB
25(OH)D3+25(OH)D2 SERPL-MCNC: 37 NG/ML (ref 30–96)
ALBUMIN SERPL BCP-MCNC: 3.8 G/DL (ref 3.5–5.2)
ALP SERPL-CCNC: 93 U/L (ref 55–135)
ALT SERPL W/O P-5'-P-CCNC: 7 U/L (ref 10–44)
ANION GAP SERPL CALC-SCNC: 13 MMOL/L (ref 8–16)
AST SERPL-CCNC: 49 U/L (ref 10–40)
BASOPHILS # BLD AUTO: 0.04 K/UL (ref 0–0.2)
BASOPHILS NFR BLD: 0.7 % (ref 0–1.9)
BILIRUB SERPL-MCNC: 0.5 MG/DL (ref 0.1–1)
BUN SERPL-MCNC: 16 MG/DL (ref 8–23)
CALCIUM SERPL-MCNC: 10.2 MG/DL (ref 8.7–10.5)
CHLORIDE SERPL-SCNC: 103 MMOL/L (ref 95–110)
CHOLEST SERPL-MCNC: 200 MG/DL (ref 120–199)
CHOLEST/HDLC SERPL: 2.6 {RATIO} (ref 2–5)
CO2 SERPL-SCNC: 24 MMOL/L (ref 23–29)
CREAT SERPL-MCNC: 1.2 MG/DL (ref 0.5–1.4)
DIFFERENTIAL METHOD: NORMAL
EOSINOPHIL # BLD AUTO: 0.2 K/UL (ref 0–0.5)
EOSINOPHIL NFR BLD: 4.1 % (ref 0–8)
ERYTHROCYTE [DISTWIDTH] IN BLOOD BY AUTOMATED COUNT: 12.5 % (ref 11.5–14.5)
EST. GFR  (AFRICAN AMERICAN): 54 ML/MIN/1.73 M^2
EST. GFR  (NON AFRICAN AMERICAN): 46.9 ML/MIN/1.73 M^2
ESTIMATED AVG GLUCOSE: 114 MG/DL (ref 68–131)
GLUCOSE SERPL-MCNC: 87 MG/DL (ref 70–110)
HBA1C MFR BLD: 5.6 % (ref 4–5.6)
HCT VFR BLD AUTO: 42.7 % (ref 37–48.5)
HDLC SERPL-MCNC: 76 MG/DL (ref 40–75)
HDLC SERPL: 38 % (ref 20–50)
HGB BLD-MCNC: 13.7 G/DL (ref 12–16)
IMM GRANULOCYTES # BLD AUTO: 0.01 K/UL (ref 0–0.04)
IMM GRANULOCYTES NFR BLD AUTO: 0.2 % (ref 0–0.5)
LDLC SERPL CALC-MCNC: 112.4 MG/DL (ref 63–159)
LYMPHOCYTES # BLD AUTO: 2.1 K/UL (ref 1–4.8)
LYMPHOCYTES NFR BLD: 37 % (ref 18–48)
MCH RBC QN AUTO: 27.9 PG (ref 27–31)
MCHC RBC AUTO-ENTMCNC: 32.1 G/DL (ref 32–36)
MCV RBC AUTO: 87 FL (ref 82–98)
MONOCYTES # BLD AUTO: 0.5 K/UL (ref 0.3–1)
MONOCYTES NFR BLD: 9 % (ref 4–15)
NEUTROPHILS # BLD AUTO: 2.8 K/UL (ref 1.8–7.7)
NEUTROPHILS NFR BLD: 49 % (ref 38–73)
NONHDLC SERPL-MCNC: 124 MG/DL
NRBC BLD-RTO: 0 /100 WBC
PLATELET # BLD AUTO: 333 K/UL (ref 150–450)
PMV BLD AUTO: 9.8 FL (ref 9.2–12.9)
POTASSIUM SERPL-SCNC: 5.2 MMOL/L (ref 3.5–5.1)
PROT SERPL-MCNC: 7.8 G/DL (ref 6–8.4)
RBC # BLD AUTO: 4.91 M/UL (ref 4–5.4)
SODIUM SERPL-SCNC: 140 MMOL/L (ref 136–145)
TRIGL SERPL-MCNC: 58 MG/DL (ref 30–150)
TSH SERPL DL<=0.005 MIU/L-ACNC: 2.59 UIU/ML (ref 0.4–4)
URATE SERPL-MCNC: 5.2 MG/DL (ref 2.4–5.7)
WBC # BLD AUTO: 5.65 K/UL (ref 3.9–12.7)

## 2022-06-17 PROCEDURE — 82306 VITAMIN D 25 HYDROXY: CPT | Performed by: INTERNAL MEDICINE

## 2022-06-17 PROCEDURE — 99999 PR PBB SHADOW E&M-EST. PATIENT-LVL III: ICD-10-PCS | Mod: PBBFAC,,, | Performed by: INTERNAL MEDICINE

## 2022-06-17 PROCEDURE — 3078F PR MOST RECENT DIASTOLIC BLOOD PRESSURE < 80 MM HG: ICD-10-PCS | Mod: CPTII,S$GLB,, | Performed by: INTERNAL MEDICINE

## 2022-06-17 PROCEDURE — 1159F PR MEDICATION LIST DOCUMENTED IN MEDICAL RECORD: ICD-10-PCS | Mod: CPTII,S$GLB,, | Performed by: INTERNAL MEDICINE

## 2022-06-17 PROCEDURE — 3074F SYST BP LT 130 MM HG: CPT | Mod: CPTII,S$GLB,, | Performed by: INTERNAL MEDICINE

## 2022-06-17 PROCEDURE — 1159F MED LIST DOCD IN RCRD: CPT | Mod: CPTII,S$GLB,, | Performed by: INTERNAL MEDICINE

## 2022-06-17 PROCEDURE — 84550 ASSAY OF BLOOD/URIC ACID: CPT | Performed by: INTERNAL MEDICINE

## 2022-06-17 PROCEDURE — 3008F BODY MASS INDEX DOCD: CPT | Mod: CPTII,S$GLB,, | Performed by: INTERNAL MEDICINE

## 2022-06-17 PROCEDURE — 36415 COLL VENOUS BLD VENIPUNCTURE: CPT | Performed by: INTERNAL MEDICINE

## 2022-06-17 PROCEDURE — 3288F PR FALLS RISK ASSESSMENT DOCUMENTED: ICD-10-PCS | Mod: CPTII,S$GLB,, | Performed by: INTERNAL MEDICINE

## 2022-06-17 PROCEDURE — 1126F AMNT PAIN NOTED NONE PRSNT: CPT | Mod: CPTII,S$GLB,, | Performed by: INTERNAL MEDICINE

## 2022-06-17 PROCEDURE — 1101F PT FALLS ASSESS-DOCD LE1/YR: CPT | Mod: CPTII,S$GLB,, | Performed by: INTERNAL MEDICINE

## 2022-06-17 PROCEDURE — 3078F DIAST BP <80 MM HG: CPT | Mod: CPTII,S$GLB,, | Performed by: INTERNAL MEDICINE

## 2022-06-17 PROCEDURE — 99214 OFFICE O/P EST MOD 30 MIN: CPT | Mod: S$GLB,,, | Performed by: INTERNAL MEDICINE

## 2022-06-17 PROCEDURE — 99214 PR OFFICE/OUTPT VISIT, EST, LEVL IV, 30-39 MIN: ICD-10-PCS | Mod: S$GLB,,, | Performed by: INTERNAL MEDICINE

## 2022-06-17 PROCEDURE — 84443 ASSAY THYROID STIM HORMONE: CPT | Performed by: INTERNAL MEDICINE

## 2022-06-17 PROCEDURE — 80061 LIPID PANEL: CPT | Performed by: INTERNAL MEDICINE

## 2022-06-17 PROCEDURE — 3074F PR MOST RECENT SYSTOLIC BLOOD PRESSURE < 130 MM HG: ICD-10-PCS | Mod: CPTII,S$GLB,, | Performed by: INTERNAL MEDICINE

## 2022-06-17 PROCEDURE — 1126F PR PAIN SEVERITY QUANTIFIED, NO PAIN PRESENT: ICD-10-PCS | Mod: CPTII,S$GLB,, | Performed by: INTERNAL MEDICINE

## 2022-06-17 PROCEDURE — 1101F PR PT FALLS ASSESS DOC 0-1 FALLS W/OUT INJ PAST YR: ICD-10-PCS | Mod: CPTII,S$GLB,, | Performed by: INTERNAL MEDICINE

## 2022-06-17 PROCEDURE — 3008F PR BODY MASS INDEX (BMI) DOCUMENTED: ICD-10-PCS | Mod: CPTII,S$GLB,, | Performed by: INTERNAL MEDICINE

## 2022-06-17 PROCEDURE — 83036 HEMOGLOBIN GLYCOSYLATED A1C: CPT | Performed by: INTERNAL MEDICINE

## 2022-06-17 PROCEDURE — 85025 COMPLETE CBC W/AUTO DIFF WBC: CPT | Performed by: INTERNAL MEDICINE

## 2022-06-17 PROCEDURE — 80053 COMPREHEN METABOLIC PANEL: CPT | Performed by: INTERNAL MEDICINE

## 2022-06-17 PROCEDURE — 3288F FALL RISK ASSESSMENT DOCD: CPT | Mod: CPTII,S$GLB,, | Performed by: INTERNAL MEDICINE

## 2022-06-17 PROCEDURE — 99999 PR PBB SHADOW E&M-EST. PATIENT-LVL III: CPT | Mod: PBBFAC,,, | Performed by: INTERNAL MEDICINE

## 2022-06-17 RX ORDER — ATENOLOL 50 MG/1
50 TABLET ORAL DAILY
Qty: 90 TABLET | Refills: 3 | Status: SHIPPED | OUTPATIENT
Start: 2022-06-17 | End: 2023-03-02 | Stop reason: SDUPTHER

## 2022-06-17 RX ORDER — SPIRONOLACTONE 25 MG/1
25 TABLET ORAL DAILY
Qty: 90 TABLET | Refills: 3 | Status: SHIPPED | OUTPATIENT
Start: 2022-06-17 | End: 2023-03-02 | Stop reason: SDUPTHER

## 2022-06-17 RX ORDER — ERGOCALCIFEROL 1.25 MG/1
50000 CAPSULE ORAL
Qty: 12 CAPSULE | Refills: 3 | Status: SHIPPED | OUTPATIENT
Start: 2022-06-17 | End: 2023-03-02 | Stop reason: SDUPTHER

## 2022-06-17 RX ORDER — CLOBETASOL PROPIONATE 0.5 MG/G
CREAM TOPICAL 2 TIMES DAILY
Qty: 60 G | Refills: 11 | Status: SHIPPED | OUTPATIENT
Start: 2022-06-17 | End: 2023-08-22 | Stop reason: SDUPTHER

## 2022-06-17 RX ORDER — ALLOPURINOL 100 MG/1
100 TABLET ORAL DAILY
Qty: 90 TABLET | Refills: 3 | Status: SHIPPED | OUTPATIENT
Start: 2022-06-17 | End: 2023-03-02 | Stop reason: SDUPTHER

## 2022-06-20 ENCOUNTER — TELEPHONE (OUTPATIENT)
Dept: INTERNAL MEDICINE | Facility: CLINIC | Age: 67
End: 2022-06-20
Payer: COMMERCIAL

## 2022-06-20 NOTE — TELEPHONE ENCOUNTER
----- Message from Shaq Sheppard MD sent at 6/18/2022  9:07 AM CDT -----  Labs are within normal limits. Please call patient to give results.  Result letter mailed to her house.

## 2022-07-27 ENCOUNTER — HOSPITAL ENCOUNTER (OUTPATIENT)
Dept: RADIOLOGY | Facility: OTHER | Age: 67
Discharge: HOME | End: 2022-07-27
Attending: INTERNAL MEDICINE
Payer: COMMERCIAL

## 2022-07-27 DIAGNOSIS — Z12.31 ENCOUNTER FOR SCREENING MAMMOGRAM FOR BREAST CANCER: ICD-10-CM

## 2022-07-27 PROCEDURE — 77067 MAMMO DIGITAL SCREENING BILAT WITH TOMO: ICD-10-PCS | Mod: 26,,, | Performed by: RADIOLOGY

## 2022-07-27 PROCEDURE — 77063 BREAST TOMOSYNTHESIS BI: CPT | Mod: TC

## 2022-07-27 PROCEDURE — 77067 SCR MAMMO BI INCL CAD: CPT | Mod: 26,,, | Performed by: RADIOLOGY

## 2022-07-27 PROCEDURE — 77063 BREAST TOMOSYNTHESIS BI: CPT | Mod: 26,,, | Performed by: RADIOLOGY

## 2022-07-27 PROCEDURE — 77063 MAMMO DIGITAL SCREENING BILAT WITH TOMO: ICD-10-PCS | Mod: 26,,, | Performed by: RADIOLOGY

## 2022-07-27 PROCEDURE — 77067 SCR MAMMO BI INCL CAD: CPT | Mod: TC

## 2023-03-02 ENCOUNTER — LAB VISIT (OUTPATIENT)
Dept: LAB | Facility: HOSPITAL | Age: 68
End: 2023-03-02
Attending: INTERNAL MEDICINE
Payer: COMMERCIAL

## 2023-03-02 ENCOUNTER — OFFICE VISIT (OUTPATIENT)
Dept: INTERNAL MEDICINE | Facility: CLINIC | Age: 68
End: 2023-03-02
Payer: COMMERCIAL

## 2023-03-02 VITALS
HEART RATE: 62 BPM | RESPIRATION RATE: 18 BRPM | WEIGHT: 187.38 LBS | DIASTOLIC BLOOD PRESSURE: 70 MMHG | OXYGEN SATURATION: 100 % | SYSTOLIC BLOOD PRESSURE: 120 MMHG | BODY MASS INDEX: 33.19 KG/M2

## 2023-03-02 DIAGNOSIS — E79.0 HYPERURICEMIA: ICD-10-CM

## 2023-03-02 DIAGNOSIS — I10 ESSENTIAL HYPERTENSION: Chronic | ICD-10-CM

## 2023-03-02 DIAGNOSIS — J30.1 SEASONAL ALLERGIC RHINITIS DUE TO POLLEN: Chronic | ICD-10-CM

## 2023-03-02 DIAGNOSIS — Z00.00 ANNUAL PHYSICAL EXAM: ICD-10-CM

## 2023-03-02 DIAGNOSIS — G47.33 OBSTRUCTIVE SLEEP APNEA ON CPAP: ICD-10-CM

## 2023-03-02 DIAGNOSIS — D12.6 TUBULAR ADENOMA OF COLON: ICD-10-CM

## 2023-03-02 DIAGNOSIS — L30.8 OTHER ECZEMA: ICD-10-CM

## 2023-03-02 DIAGNOSIS — H40.1194 PRIMARY OPEN-ANGLE GLAUCOMA, INDETERMINATE STAGE, UNSPECIFIED LATERALITY: Chronic | ICD-10-CM

## 2023-03-02 DIAGNOSIS — Z28.82 VACCINE REFUSED BY PARENT: ICD-10-CM

## 2023-03-02 DIAGNOSIS — Z00.00 ANNUAL PHYSICAL EXAM: Primary | ICD-10-CM

## 2023-03-02 DIAGNOSIS — L81.4 SOLAR LENTIGO: ICD-10-CM

## 2023-03-02 DIAGNOSIS — E55.9 VITAMIN D DEFICIENCY: ICD-10-CM

## 2023-03-02 DIAGNOSIS — M10.9 PODAGRA: ICD-10-CM

## 2023-03-02 LAB
ALBUMIN SERPL BCP-MCNC: 3.6 G/DL (ref 3.5–5.2)
ALP SERPL-CCNC: 106 U/L (ref 55–135)
ALT SERPL W/O P-5'-P-CCNC: 10 U/L (ref 10–44)
ANION GAP SERPL CALC-SCNC: 5 MMOL/L (ref 8–16)
AST SERPL-CCNC: 48 U/L (ref 10–40)
BILIRUB SERPL-MCNC: 0.6 MG/DL (ref 0.1–1)
BUN SERPL-MCNC: 11 MG/DL (ref 8–23)
CALCIUM SERPL-MCNC: 9.6 MG/DL (ref 8.7–10.5)
CHLORIDE SERPL-SCNC: 105 MMOL/L (ref 95–110)
CO2 SERPL-SCNC: 29 MMOL/L (ref 23–29)
CREAT SERPL-MCNC: 1 MG/DL (ref 0.5–1.4)
EST. GFR  (NO RACE VARIABLE): >60 ML/MIN/1.73 M^2
GLUCOSE SERPL-MCNC: 83 MG/DL (ref 70–110)
POTASSIUM SERPL-SCNC: 4.4 MMOL/L (ref 3.5–5.1)
PROT SERPL-MCNC: 7.3 G/DL (ref 6–8.4)
SODIUM SERPL-SCNC: 139 MMOL/L (ref 136–145)
URATE SERPL-MCNC: 5.2 MG/DL (ref 2.4–5.7)

## 2023-03-02 PROCEDURE — 1126F AMNT PAIN NOTED NONE PRSNT: CPT | Mod: CPTII,S$GLB,, | Performed by: INTERNAL MEDICINE

## 2023-03-02 PROCEDURE — 99214 PR OFFICE/OUTPT VISIT, EST, LEVL IV, 30-39 MIN: ICD-10-PCS | Mod: S$GLB,,, | Performed by: INTERNAL MEDICINE

## 2023-03-02 PROCEDURE — 3078F PR MOST RECENT DIASTOLIC BLOOD PRESSURE < 80 MM HG: ICD-10-PCS | Mod: CPTII,S$GLB,, | Performed by: INTERNAL MEDICINE

## 2023-03-02 PROCEDURE — 80053 COMPREHEN METABOLIC PANEL: CPT | Performed by: INTERNAL MEDICINE

## 2023-03-02 PROCEDURE — 36415 COLL VENOUS BLD VENIPUNCTURE: CPT | Performed by: INTERNAL MEDICINE

## 2023-03-02 PROCEDURE — 99999 PR PBB SHADOW E&M-EST. PATIENT-LVL IV: ICD-10-PCS | Mod: PBBFAC,,, | Performed by: INTERNAL MEDICINE

## 2023-03-02 PROCEDURE — 3008F PR BODY MASS INDEX (BMI) DOCUMENTED: ICD-10-PCS | Mod: CPTII,S$GLB,, | Performed by: INTERNAL MEDICINE

## 2023-03-02 PROCEDURE — 1126F PR PAIN SEVERITY QUANTIFIED, NO PAIN PRESENT: ICD-10-PCS | Mod: CPTII,S$GLB,, | Performed by: INTERNAL MEDICINE

## 2023-03-02 PROCEDURE — 3288F FALL RISK ASSESSMENT DOCD: CPT | Mod: CPTII,S$GLB,, | Performed by: INTERNAL MEDICINE

## 2023-03-02 PROCEDURE — 84550 ASSAY OF BLOOD/URIC ACID: CPT | Performed by: INTERNAL MEDICINE

## 2023-03-02 PROCEDURE — 3288F PR FALLS RISK ASSESSMENT DOCUMENTED: ICD-10-PCS | Mod: CPTII,S$GLB,, | Performed by: INTERNAL MEDICINE

## 2023-03-02 PROCEDURE — 1101F PT FALLS ASSESS-DOCD LE1/YR: CPT | Mod: CPTII,S$GLB,, | Performed by: INTERNAL MEDICINE

## 2023-03-02 PROCEDURE — 99214 OFFICE O/P EST MOD 30 MIN: CPT | Mod: S$GLB,,, | Performed by: INTERNAL MEDICINE

## 2023-03-02 PROCEDURE — 1159F PR MEDICATION LIST DOCUMENTED IN MEDICAL RECORD: ICD-10-PCS | Mod: CPTII,S$GLB,, | Performed by: INTERNAL MEDICINE

## 2023-03-02 PROCEDURE — 3008F BODY MASS INDEX DOCD: CPT | Mod: CPTII,S$GLB,, | Performed by: INTERNAL MEDICINE

## 2023-03-02 PROCEDURE — 99999 PR PBB SHADOW E&M-EST. PATIENT-LVL IV: CPT | Mod: PBBFAC,,, | Performed by: INTERNAL MEDICINE

## 2023-03-02 PROCEDURE — 3074F SYST BP LT 130 MM HG: CPT | Mod: CPTII,S$GLB,, | Performed by: INTERNAL MEDICINE

## 2023-03-02 PROCEDURE — 3074F PR MOST RECENT SYSTOLIC BLOOD PRESSURE < 130 MM HG: ICD-10-PCS | Mod: CPTII,S$GLB,, | Performed by: INTERNAL MEDICINE

## 2023-03-02 PROCEDURE — 1159F MED LIST DOCD IN RCRD: CPT | Mod: CPTII,S$GLB,, | Performed by: INTERNAL MEDICINE

## 2023-03-02 PROCEDURE — 1101F PR PT FALLS ASSESS DOC 0-1 FALLS W/OUT INJ PAST YR: ICD-10-PCS | Mod: CPTII,S$GLB,, | Performed by: INTERNAL MEDICINE

## 2023-03-02 PROCEDURE — 3078F DIAST BP <80 MM HG: CPT | Mod: CPTII,S$GLB,, | Performed by: INTERNAL MEDICINE

## 2023-03-02 RX ORDER — ALLOPURINOL 100 MG/1
100 TABLET ORAL DAILY
Qty: 90 TABLET | Refills: 3 | Status: SHIPPED | OUTPATIENT
Start: 2023-03-02 | End: 2024-03-21 | Stop reason: SDUPTHER

## 2023-03-02 RX ORDER — ATORVASTATIN CALCIUM 40 MG/1
40 TABLET, FILM COATED ORAL
COMMUNITY
Start: 2022-10-14 | End: 2023-03-02

## 2023-03-02 RX ORDER — ATENOLOL 50 MG/1
50 TABLET ORAL DAILY
Qty: 90 TABLET | Refills: 3 | Status: SHIPPED | OUTPATIENT
Start: 2023-03-02 | End: 2024-03-25 | Stop reason: SDUPTHER

## 2023-03-02 RX ORDER — DORZOLAMIDE HYDROCHLORIDE AND TIMOLOL MALEATE 20; 5 MG/ML; MG/ML
1 SOLUTION/ DROPS OPHTHALMIC 2 TIMES DAILY
COMMUNITY
Start: 2023-01-18

## 2023-03-02 RX ORDER — SPIRONOLACTONE 25 MG/1
25 TABLET ORAL DAILY
Qty: 90 TABLET | Refills: 3 | Status: SHIPPED | OUTPATIENT
Start: 2023-03-02 | End: 2024-03-25 | Stop reason: SDUPTHER

## 2023-03-02 RX ORDER — HYDROQUINONE 40 MG/G
CREAM TOPICAL 2 TIMES DAILY
Qty: 57 G | Refills: 11 | Status: SHIPPED | OUTPATIENT
Start: 2023-03-02 | End: 2024-03-25 | Stop reason: SDUPTHER

## 2023-03-02 RX ORDER — ERGOCALCIFEROL 1.25 MG/1
50000 CAPSULE ORAL
Qty: 12 CAPSULE | Refills: 3 | Status: SHIPPED | OUTPATIENT
Start: 2023-03-02 | End: 2024-03-25 | Stop reason: SDUPTHER

## 2023-03-07 RX ORDER — HYDROXYZINE HYDROCHLORIDE 25 MG/1
25 TABLET, FILM COATED ORAL 3 TIMES DAILY PRN
Qty: 30 TABLET | Refills: 0 | Status: SHIPPED | OUTPATIENT
Start: 2023-03-07

## 2023-06-02 ENCOUNTER — TELEPHONE (OUTPATIENT)
Dept: INTERNAL MEDICINE | Facility: CLINIC | Age: 68
End: 2023-06-02
Payer: COMMERCIAL

## 2023-06-02 DIAGNOSIS — Z12.31 ENCOUNTER FOR SCREENING MAMMOGRAM FOR BREAST CANCER: Primary | ICD-10-CM

## 2023-06-02 NOTE — TELEPHONE ENCOUNTER
Looks like she did her last one towards the end of July 07/22/22 so she has to wait to do it until then correct? 1 year and a day apart from the last one she did.

## 2023-06-02 NOTE — TELEPHONE ENCOUNTER
----- Message from Skylar Saldana sent at 6/2/2023  9:21 AM CDT -----  Contact: pt [phone]  Caller is requesting to schedule their annual screening mammogram appointment. Order is not listed in Epic.  Please enter order and contact patient to schedule.  Would the patient like a call back, or a response through their [facility] portal?:   call    Thank you

## 2023-07-28 ENCOUNTER — HOSPITAL ENCOUNTER (OUTPATIENT)
Dept: RADIOLOGY | Facility: HOSPITAL | Age: 68
Discharge: HOME | End: 2023-07-28
Attending: INTERNAL MEDICINE
Payer: COMMERCIAL

## 2023-07-28 DIAGNOSIS — Z12.31 ENCOUNTER FOR SCREENING MAMMOGRAM FOR BREAST CANCER: ICD-10-CM

## 2023-07-28 PROCEDURE — 77067 SCR MAMMO BI INCL CAD: CPT | Mod: TC

## 2023-07-28 PROCEDURE — 77067 MAMMO DIGITAL SCREENING BILAT WITH TOMO: ICD-10-PCS | Mod: 26,,, | Performed by: RADIOLOGY

## 2023-07-28 PROCEDURE — 77063 MAMMO DIGITAL SCREENING BILAT WITH TOMO: ICD-10-PCS | Mod: 26,,, | Performed by: RADIOLOGY

## 2023-07-28 PROCEDURE — 77067 SCR MAMMO BI INCL CAD: CPT | Mod: 26,,, | Performed by: RADIOLOGY

## 2023-07-28 PROCEDURE — 77063 BREAST TOMOSYNTHESIS BI: CPT | Mod: 26,,, | Performed by: RADIOLOGY

## 2023-08-22 DIAGNOSIS — L30.8 OTHER ECZEMA: ICD-10-CM

## 2023-08-22 RX ORDER — CLOBETASOL PROPIONATE 0.5 MG/G
CREAM TOPICAL 2 TIMES DAILY
Qty: 60 G | Refills: 11 | Status: SHIPPED | OUTPATIENT
Start: 2023-08-22

## 2023-12-23 ENCOUNTER — OFFICE VISIT (OUTPATIENT)
Dept: URGENT CARE | Facility: CLINIC | Age: 68
End: 2023-12-23
Payer: COMMERCIAL

## 2023-12-23 VITALS
BODY MASS INDEX: 33.2 KG/M2 | HEIGHT: 63 IN | TEMPERATURE: 100 F | OXYGEN SATURATION: 95 % | SYSTOLIC BLOOD PRESSURE: 108 MMHG | RESPIRATION RATE: 16 BRPM | HEART RATE: 88 BPM | WEIGHT: 187.38 LBS | DIASTOLIC BLOOD PRESSURE: 70 MMHG

## 2023-12-23 DIAGNOSIS — J06.9 UPPER RESPIRATORY TRACT INFECTION, UNSPECIFIED TYPE: ICD-10-CM

## 2023-12-23 DIAGNOSIS — R05.9 COUGH, UNSPECIFIED TYPE: Primary | ICD-10-CM

## 2023-12-23 DIAGNOSIS — R50.9 FEVER, UNSPECIFIED FEVER CAUSE: ICD-10-CM

## 2023-12-23 LAB
CTP QC/QA: YES
MOLECULAR STREP A: NEGATIVE
POC MOLECULAR INFLUENZA A AGN: NEGATIVE
POC MOLECULAR INFLUENZA B AGN: NEGATIVE
POC RSV RAPID ANT MOLECULAR: NEGATIVE
SARS-COV-2 AG RESP QL IA.RAPID: NEGATIVE

## 2023-12-23 PROCEDURE — 87651 STREP A DNA AMP PROBE: CPT | Mod: QW,S$GLB,, | Performed by: FAMILY MEDICINE

## 2023-12-23 PROCEDURE — 87651 POCT STREP A MOLECULAR: ICD-10-PCS | Mod: QW,S$GLB,, | Performed by: FAMILY MEDICINE

## 2023-12-23 PROCEDURE — 87502 POCT INFLUENZA A/B MOLECULAR: ICD-10-PCS | Mod: QW,S$GLB,, | Performed by: FAMILY MEDICINE

## 2023-12-23 PROCEDURE — 87811 SARS CORONAVIRUS 2 ANTIGEN POCT, MANUAL READ: ICD-10-PCS | Mod: QW,S$GLB,, | Performed by: FAMILY MEDICINE

## 2023-12-23 PROCEDURE — 99214 OFFICE O/P EST MOD 30 MIN: CPT | Mod: S$GLB,,, | Performed by: FAMILY MEDICINE

## 2023-12-23 PROCEDURE — 87634 POCT RESPIRATORY SYNCYTIAL VIRUS BY MOLECULAR: ICD-10-PCS | Mod: QW,S$GLB,, | Performed by: FAMILY MEDICINE

## 2023-12-23 PROCEDURE — 99214 PR OFFICE/OUTPT VISIT, EST, LEVL IV, 30-39 MIN: ICD-10-PCS | Mod: S$GLB,,, | Performed by: FAMILY MEDICINE

## 2023-12-23 PROCEDURE — 87502 INFLUENZA DNA AMP PROBE: CPT | Mod: QW,S$GLB,, | Performed by: FAMILY MEDICINE

## 2023-12-23 PROCEDURE — 87634 RSV DNA/RNA AMP PROBE: CPT | Mod: QW,S$GLB,, | Performed by: FAMILY MEDICINE

## 2023-12-23 PROCEDURE — 87811 SARS-COV-2 COVID19 W/OPTIC: CPT | Mod: QW,S$GLB,, | Performed by: FAMILY MEDICINE

## 2023-12-23 RX ORDER — IBUPROFEN 200 MG
600 TABLET ORAL
Status: COMPLETED | OUTPATIENT
Start: 2023-12-23 | End: 2023-12-23

## 2023-12-23 RX ORDER — IBUPROFEN 600 MG/1
600 TABLET ORAL EVERY 6 HOURS PRN
Qty: 30 TABLET | Refills: 0 | Status: SHIPPED | OUTPATIENT
Start: 2023-12-23

## 2023-12-23 RX ORDER — BENZONATATE 200 MG/1
200 CAPSULE ORAL 3 TIMES DAILY PRN
Qty: 30 CAPSULE | Refills: 0 | Status: SHIPPED | OUTPATIENT
Start: 2023-12-23 | End: 2024-01-02

## 2023-12-23 RX ORDER — LEVOCETIRIZINE DIHYDROCHLORIDE 5 MG/1
5 TABLET, FILM COATED ORAL NIGHTLY
Qty: 30 TABLET | Refills: 11 | Status: SHIPPED | OUTPATIENT
Start: 2023-12-23 | End: 2024-12-22

## 2023-12-23 RX ORDER — FLUTICASONE PROPIONATE 50 MCG
1 SPRAY, SUSPENSION (ML) NASAL DAILY
Qty: 9.9 ML | Refills: 0 | Status: SHIPPED | OUTPATIENT
Start: 2023-12-23 | End: 2024-01-05

## 2023-12-23 RX ADMIN — Medication 600 MG: at 01:12

## 2023-12-23 NOTE — PROGRESS NOTES
"Subjective:      Patient ID: Razia Keyes is a 68 y.o. female.    Vitals:  height is 5' 3" (1.6 m) and weight is 85 kg (187 lb 6.3 oz). Her oral temperature is 100.4 °F (38 °C) (abnormal). Her blood pressure is 108/70 and her pulse is 88. Her respiration is 16 and oxygen saturation is 95%.     Chief Complaint: Cough    Pt reports that she has been having a dry cough, nasal congestion, fever, body aches, and chills. Pt reports that she has been having symptoms since Tuesday. Pt has not been taking any medication for symptoms    Cough  This is a new problem. The current episode started in the past 7 days (tuesday). The problem has been unchanged. The cough is Non-productive. Associated symptoms include chills, a fever and nasal congestion. Pertinent negatives include no chest pain, ear congestion, ear pain, headaches, heartburn, hemoptysis, myalgias, postnasal drip, rash, rhinorrhea, sore throat, shortness of breath, sweats, weight loss or wheezing. She has tried nothing for the symptoms. The treatment provided no relief. There is no history of asthma, bronchitis or COPD.       Constitution: Positive for chills and fever.   HENT:  Negative for ear pain, postnasal drip and sore throat.    Cardiovascular:  Negative for chest pain.   Respiratory:  Positive for cough. Negative for bloody sputum, shortness of breath and wheezing.    Gastrointestinal:  Negative for heartburn.   Musculoskeletal:  Negative for muscle ache.   Skin:  Negative for rash.   Neurological:  Negative for headaches.      Objective:     Physical Exam   Constitutional: She is oriented to person, place, and time.  Non-toxic appearance. She appears ill.   HENT:   Head: Normocephalic.   Ears:   Right Ear: External ear normal.   Left Ear: External ear normal.   Nose: Rhinorrhea and congestion present.   Mouth/Throat: Mucous membranes are moist. Posterior oropharyngeal erythema present. No oropharyngeal exudate.   Eyes: Right conjunctiva is injected. Left " conjunctiva is injected.   Cardiovascular: Normal rate.   Pulmonary/Chest: Effort normal. She has no wheezes. She has rhonchi.   Musculoskeletal: Normal range of motion.         General: Normal range of motion.   Neurological: She is alert and oriented to person, place, and time.   Skin: Skin is dry.   Psychiatric: Her behavior is normal.       Assessment:     1. Cough, unspecified type    2. Fever, unspecified fever cause    3. Upper respiratory tract infection, unspecified type      Results for orders placed or performed in visit on 12/23/23   POCT Influenza A/B MOLECULAR   Result Value Ref Range    POC Molecular Influenza A Ag Negative Negative, Not Reported    POC Molecular Influenza B Ag Negative Negative, Not Reported     Acceptable Yes    POCT RSV by Molecular   Result Value Ref Range    POC RSV Rapid Ant Molecular Negative Negative     Acceptable Yes    SARS Coronavirus 2 Antigen, POCT Manual Read   Result Value Ref Range    SARS Coronavirus 2 Antigen Negative Negative     Acceptable Yes    POCT Strep A, Molecular   Result Value Ref Range    Molecular Strep A, POC Negative Negative     Acceptable Yes        Plan:       Cough, unspecified type  -     POCT Influenza A/B MOLECULAR    Fever, unspecified fever cause  -     POCT RSV by Molecular  -     SARS Coronavirus 2 Antigen, POCT Manual Read  -     POCT Strep A, Molecular  -     ibuprofen tablet 600 mg    Upper respiratory tract infection, unspecified type      Febrile   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     Supportive care.   RTC PRN

## 2023-12-29 DIAGNOSIS — J06.9 UPPER RESPIRATORY TRACT INFECTION, UNSPECIFIED TYPE: ICD-10-CM

## 2024-01-05 RX ORDER — FLUTICASONE PROPIONATE 50 MCG
SPRAY, SUSPENSION (ML) NASAL
Qty: 16 ML | Refills: 0 | Status: SHIPPED | OUTPATIENT
Start: 2024-01-05

## 2024-01-19 NOTE — PROGRESS NOTES
INTERNAL MEDICINE CLINIC  Follow-up Visit Progress Note     PRESENTING HISTORY     PCP: Shaq Sheppard MD    Last Clinic Visit with me:  6-    Current Chief Complaint/Problem:    Chief Complaint   Patient presents with    Annual Exam     History of Present Illness & ROS: Ms. Razia Keyes is a 68 y.o. female.    Review of Systems:  Review of Systems   Constitutional:  Negative for chills and fever.   Respiratory:  Negative for cough and shortness of breath.    Cardiovascular:  Negative for chest pain and leg swelling.   Gastrointestinal:  Negative for blood in stool.   Genitourinary:  Negative for hematuria.   Musculoskeletal:  Negative for joint pain.   Skin:  Negative for rash.   Neurological:  Negative for headaches.   Psychiatric/Behavioral:  Negative for depression.        PAST HISTORY:     Past Medical History:   Diagnosis Date    BMI 33.0-33.9,adult 1/7/2021    Elevated HDL 02/03/2015    Essential hypertension 11/27/2012    Hyperuricemia 1/7/2021    Obstructive sleep apnea on CPAP     Podagra 1/7/2021    Prediabetes 8/1/2020    Mild.Diet control.    Primary open angle glaucoma (POAG) 10/17/2018    Seasonal allergic rhinitis due to pollen 10/17/2018    Tubular adenoma of colon (cecum) 12/3/2018    Last C-scope .  Next in 11- 2023    Vaccine refused by parent 8/6/2019    Vitamin D deficiency 10/17/2018       Past Surgical History:   Procedure Laterality Date    COLONOSCOPY  2007    Normal 2007. She should return in 10 years    COLONOSCOPY N/A 11/21/2018    Procedure: COLONOSCOPY;  Surgeon: Brody August MD;  Location: [address]);  Service: Endoscopy;  Laterality: N/A;    HYSTERECTOMY      Age 27       Family History   Problem Relation Age of Onset    Lymphoma Father     Diabetes Mother     Dementia Mother     Anesthesia problems Maternal Uncle     Sleep apnea Brother     Gout Brother     Allergic rhinitis Neg Hx     Allergies Neg Hx     Angioedema Neg Hx     Asthma Neg Hx     Atopy  Patient ambulated with no difficulties, no dizziness, no SOB. Maintained O2 sat above 92%.    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 She exercises by walking about at school and in the stairways.   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      Social Determinants of Health     Financial Resource Strain: Low Risk     Difficulty of Paying Living Expenses: Not very hard   Food Insecurity: No Food Insecurity    Worried About Running Out of Food in the Last Year: Never true    Ran Out of Food in the Last Year: Never true   Transportation Needs: No Transportation Needs    Lack of Transportation (Medical): No    Lack of Transportation (Non-Medical): No   Physical Activity: Sufficiently Active    Days of Exercise per Week: 6 days    Minutes of Exercise per Session: 50 min   Stress: No Stress Concern Present    Feeling of Stress : Only a little   Social Connections: Unknown    Frequency of Communication with Friends and Family: Three times a week    Frequency of Social Gatherings with Friends and Family: Never    Active Member of Clubs or Organizations: Yes    Attends Club or Organization Meetings: More than 4 times per year    Marital Status: Never    Housing Stability: Low Risk     Unable to Pay for Housing in the Last Year: No    Number of Places Lived in the Last Year: 1    Unstable Housing in the Last Year: No       MEDICATIONS & ALLERGIES:     Current Outpatient Medications on File Prior to Visit   Medication Sig Dispense  Refill    clobetasoL (TEMOVATE) 0.05 % cream Apply topically 2 (two) times daily. 60 g 11    dorzolamide-timolol 2-0.5% (COSOPT) 22.3-6.8 mg/mL ophthalmic solution Place 1 drop into both eyes 2 (two) times daily.      allopurinoL (ZYLOPRIM) 100 MG tablet Take 1 tablet (100 mg total) by mouth once daily. 90 tablet 3    atenoloL (TENORMIN) 50 MG tablet Take 1 tablet (50 mg total) by mouth once daily. 90 tablet 3           ergocalciferol (VITAMIN D2) 50,000 unit Cap Take 1 capsule (50,000 Units total) by mouth every 7 days. 12 capsule 3    spironolactone (ALDACTONE) 25 MG tablet Take 1 tablet (25 mg total) by mouth once daily. 90 tablet 3       Review of patient's allergies indicates:  No Known Allergies    Medications Reconciliation:   I have reconciled the patient's home medications and discharge medications with the patient/family. I have updated all changes.  Refer to After-Visit Medication List.    OBJECTIVE:     Vital Signs:  Vitals:    03/02/23 1203   BP: 120/70   Pulse: 62   Resp: 18     Wt Readings from Last 3 Encounters:   03/02/23 1203 85 kg (187 lb 6.3 oz)   06/17/22 0819 81.8 kg (180 lb 5.4 oz)   04/18/22 1809 81.4 kg (179 lb 7.3 oz)     Body mass index is 33.19 kg/m².     Physical Exam:  General: Well developed, well nourished. No distress.  HEENT: Head is normocephalic, atraumatic; ears are normal.    Eyes: Clear conjunctiva.  Neck: Supple, symmetrical neck; trachea midline.  Lungs: Clear to auscultation bilaterally and normal respiratory effort.  Cardiovascular: Heart with regular rate and rhythm.    Extremities: No LE edema. Pulses 2+ and symmetric.   Abdomen: Abdomen is soft, non-tender non-distended with normal bowel sounds.  Skin: Skin color, texture, turgor normal.    Musculoskeletal: Normal gait.   Lymph Nodes: No cervical or supraclavicular adenopathy.  Neurologic: Normal strength and tone. No focal numbness or weakness.   Psychiatric: Normal affect. Alert.      Laboratory  Lab Results    Component Value Date    WBC 5.65 06/17/2022    HGB 13.7 06/17/2022    HCT 42.7 06/17/2022     06/17/2022    CHOL 200 (H) 06/17/2022    TRIG 58 06/17/2022    HDL 76 (H) 06/17/2022    ALT 7 (L) 06/17/2022    AST 49 (H) 06/17/2022     06/17/2022    K 5.2 (H) 06/17/2022     06/17/2022    CREATININE 1.2 06/17/2022    BUN 16 06/17/2022    CO2 24 06/17/2022    TSH 2.593 06/17/2022    HGBA1C 5.6 06/17/2022       ASSESSMENT & PLAN:     Annual physical exam  - Reviewed and updated past and current medical problems.  Discussed treatment of current medical problems    -     Comprehensive Metabolic Panel; Future; Expected date: 03/02/2023  -     URIC ACID; Future; Expected date: 03/02/2023    Essential hypertension  - Tenoretic was stopped due to Gout (thiazide diuretic) and low potassium.     Now well controlled on:     -     spironolactone (ALDACTONE) 25 MG tablet; Take 1 tablet (25 mg total) by mouth once daily.     -     atenoloL (TENORMIN) 50 MG tablet; Take 1 tablet (50 mg total) by mouth once daily.        Podagra  Hyperuricemia  -     Uric acid (8-1-20) 9.7. Decreased to 4 (7/22/21)        On Allopurinol 100 mg daily since then.         No gout attack since.     Seasonal allergic rhinitis due to pollen  - On PRN Xyzal.     Primary open angle glaucoma (POAG) of both eyes, mild stage   She is followed by outside eye doctor   On  travoprost / lantanoprost (one of them depending cost)     Vitamin D deficiency  - On Vitamin D2 50,000 weekly.  -     ergocalciferol (VITAMIN D2) 50,000 unit Cap; Take 1 capsule (50,000 Units total) by mouth every 7 days.       Tubular adenoma of colon (cecum)  Last C-scope .  Next in 11- 2023     ZENAIDA (obstructive sleep apnea) - on CPAP.  - On CPAP doing well.     Vaccine refused by parent  - Patient does not want any vaccine.     BMI 33.0-33.9,adult  Body mass index is 33.19 kg/m².    - Discussed diet and exercise. Walk 7500 steps daily.    Other eczema  -      clobetasoL (TEMOVATE) 0.05 % cream; Apply topically 2 (two) times daily.      Solar lentigo  -     hydroquinone 4 % Crea; Apply topically 2 (two) times daily.  Dispense: 57 g; Refill: 11      Preventive Health Maintenance:  Refused Tdap, Shingle vaccine, Pneumovax and Flu vaccine.        Eye: Dr. Mckenzie Rodriguez ([facility])     Dermatology consult for skin exam.    Return to Clinic for Follow Up with me: 6 months.        Scheduled Follow-up :  No future appointments.      After Visit Medication List :     Medication List            Accurate as of March 2, 2023 12:14 PM. If you have any questions, ask your nurse or doctor.                START taking these medications      hydroquinone 4 % Crea  Apply topically 2 (two) times daily.  Started by: Shaq Sheppard MD            CONTINUE taking these medications      allopurinoL 100 MG tablet  Commonly known as: ZYLOPRIM  Take 1 tablet (100 mg total) by mouth once daily.     atenoloL 50 MG tablet  Commonly known as: TENORMIN  Take 1 tablet (50 mg total) by mouth once daily.     clobetasoL 0.05 % cream  Commonly known as: TEMOVATE  Apply topically 2 (two) times daily.     dorzolamide-timolol 2-0.5% 22.3-6.8 mg/mL ophthalmic solution  Commonly known as: COSOPT     ergocalciferol 50,000 unit Cap  Commonly known as: VITAMIN D2  Take 1 capsule (50,000 Units total) by mouth every 7 days.     latanoprost 0.005 % ophthalmic solution     spironolactone 25 MG tablet  Commonly known as: ALDACTONE  Take 1 tablet (25 mg total) by mouth once daily.     timolol maleate 0.5% 0.5 % Drop  Commonly known as: TIMOPTIC            STOP taking these medications      atorvastatin 40 MG tablet  Commonly known as: LIPITOR  Stopped by: Shaq Sheppard MD               Where to Get Your Medications        These medications were sent to [facility]/pharmacy #[id number] - [place], [place] - [address]  [address] [place] [address]      Phone: [phone]   allopurinoL 100 MG  tablet  atenoloL 50 MG tablet  ergocalciferol 50,000 unit Cap  hydroquinone 4 % Crea  spironolactone 25 MG tablet         Signing Physician:  Shaq Sheppard MD

## 2024-03-21 DIAGNOSIS — M10.9 PODAGRA: ICD-10-CM

## 2024-03-21 DIAGNOSIS — E79.0 HYPERURICEMIA: ICD-10-CM

## 2024-03-21 NOTE — TELEPHONE ENCOUNTER
Care Due:                  Date            Visit Type   Department     Provider  --------------------------------------------------------------------------------                                EP -                              PRIMARY      [facility] INTERNAL  Last Visit: 03-      [facility]   MEDICINE       Shaq Sheppard  Next Visit: None Scheduled  None         None Found                                                            Last  Test          Frequency    Reason                     Performed    Due Date  --------------------------------------------------------------------------------    Office Visit  12 months..  allopurinoL, atenoloL,     03- 02-                             ergocalciferol,                             spironolactone...........    CBC.........  12 months..  allopurinoL..............  06- 06-    CMP.........  12 months..  allopurinoL,               03- 02-                             ergocalciferol,                             spironolactone...........    Uric Acid...  12 months..  allopurinoL..............  03- 02-    Vitamin D...  12 months..  ergocalciferol...........  06- 06-    [facility] Embedded Care Due Messages. Reference number: [id number].   3/21/2024 10:15:14 AM CDT

## 2024-03-22 RX ORDER — ALLOPURINOL 100 MG/1
100 TABLET ORAL DAILY
Qty: 90 TABLET | Refills: 0 | Status: SHIPPED | OUTPATIENT
Start: 2024-03-22 | End: 2024-05-27 | Stop reason: SDUPTHER

## 2024-03-25 DIAGNOSIS — E55.9 VITAMIN D DEFICIENCY: ICD-10-CM

## 2024-03-25 DIAGNOSIS — L81.4 SOLAR LENTIGO: ICD-10-CM

## 2024-03-25 DIAGNOSIS — I10 ESSENTIAL HYPERTENSION: Chronic | ICD-10-CM

## 2024-03-25 RX ORDER — HYDROQUINONE 40 MG/G
CREAM TOPICAL 2 TIMES DAILY
Qty: 57 G | Refills: 2 | Status: SHIPPED | OUTPATIENT
Start: 2024-03-25

## 2024-03-25 RX ORDER — ATENOLOL 50 MG/1
50 TABLET ORAL DAILY
Qty: 90 TABLET | Refills: 0 | Status: SHIPPED | OUTPATIENT
Start: 2024-03-25 | End: 2024-05-27 | Stop reason: SDUPTHER

## 2024-03-25 RX ORDER — SPIRONOLACTONE 25 MG/1
25 TABLET ORAL DAILY
Qty: 90 TABLET | Refills: 0 | Status: SHIPPED | OUTPATIENT
Start: 2024-03-25 | End: 2024-05-27 | Stop reason: SDUPTHER

## 2024-03-25 RX ORDER — ERGOCALCIFEROL 1.25 MG/1
50000 CAPSULE ORAL
Qty: 12 CAPSULE | Refills: 0 | Status: SHIPPED | OUTPATIENT
Start: 2024-03-25 | End: 2024-05-27 | Stop reason: SDUPTHER

## 2024-03-25 NOTE — TELEPHONE ENCOUNTER
No care due was identified.  [facility] Embedded Care Due Messages. Reference number: [id number].   3/25/2024 1:35:42 PM CDT

## 2024-03-27 DIAGNOSIS — I10 ESSENTIAL HYPERTENSION: ICD-10-CM

## 2024-05-13 ENCOUNTER — PATIENT OUTREACH (OUTPATIENT)
Dept: ADMINISTRATIVE | Facility: HOSPITAL | Age: 69
End: 2024-05-13
Payer: COMMERCIAL

## 2024-05-27 ENCOUNTER — LAB VISIT (OUTPATIENT)
Dept: LAB | Facility: HOSPITAL | Age: 69
End: 2024-05-27
Attending: INTERNAL MEDICINE
Payer: COMMERCIAL

## 2024-05-27 ENCOUNTER — OFFICE VISIT (OUTPATIENT)
Dept: INTERNAL MEDICINE | Facility: CLINIC | Age: 69
End: 2024-05-27
Payer: COMMERCIAL

## 2024-05-27 VITALS
WEIGHT: 168.88 LBS | OXYGEN SATURATION: 97 % | DIASTOLIC BLOOD PRESSURE: 68 MMHG | HEART RATE: 63 BPM | BODY MASS INDEX: 29.91 KG/M2 | SYSTOLIC BLOOD PRESSURE: 110 MMHG

## 2024-05-27 DIAGNOSIS — H40.1191 PRIMARY OPEN-ANGLE GLAUCOMA, MILD STAGE, UNSPECIFIED LATERALITY: Chronic | ICD-10-CM

## 2024-05-27 DIAGNOSIS — I10 ESSENTIAL HYPERTENSION: Chronic | ICD-10-CM

## 2024-05-27 DIAGNOSIS — G47.33 OBSTRUCTIVE SLEEP APNEA ON CPAP: ICD-10-CM

## 2024-05-27 DIAGNOSIS — M10.9 PODAGRA: ICD-10-CM

## 2024-05-27 DIAGNOSIS — Z28.82 VACCINE REFUSED BY PARENT: ICD-10-CM

## 2024-05-27 DIAGNOSIS — E55.9 VITAMIN D DEFICIENCY: ICD-10-CM

## 2024-05-27 DIAGNOSIS — Z00.00 ANNUAL PHYSICAL EXAM: ICD-10-CM

## 2024-05-27 DIAGNOSIS — Z00.00 ANNUAL PHYSICAL EXAM: Primary | ICD-10-CM

## 2024-05-27 DIAGNOSIS — J30.1 SEASONAL ALLERGIC RHINITIS DUE TO POLLEN: Chronic | ICD-10-CM

## 2024-05-27 DIAGNOSIS — D12.6 TUBULAR ADENOMA OF COLON: ICD-10-CM

## 2024-05-27 DIAGNOSIS — E79.0 HYPERURICEMIA: ICD-10-CM

## 2024-05-27 DIAGNOSIS — Z23 NEED FOR SHINGLES VACCINE: ICD-10-CM

## 2024-05-27 DIAGNOSIS — Z12.31 ENCOUNTER FOR SCREENING MAMMOGRAM FOR BREAST CANCER: ICD-10-CM

## 2024-05-27 DIAGNOSIS — Z12.11 SCREENING FOR MALIGNANT NEOPLASM OF COLON: ICD-10-CM

## 2024-05-27 LAB
25(OH)D3+25(OH)D2 SERPL-MCNC: 40 NG/ML (ref 30–96)
ALBUMIN SERPL BCP-MCNC: 3.6 G/DL (ref 3.5–5.2)
ALP SERPL-CCNC: 87 U/L (ref 55–135)
ALT SERPL W/O P-5'-P-CCNC: 9 U/L (ref 10–44)
ANION GAP SERPL CALC-SCNC: 7 MMOL/L (ref 8–16)
AST SERPL-CCNC: 48 U/L (ref 10–40)
BASOPHILS # BLD AUTO: 0.05 K/UL (ref 0–0.2)
BASOPHILS NFR BLD: 0.9 % (ref 0–1.9)
BILIRUB SERPL-MCNC: 0.3 MG/DL (ref 0.1–1)
BUN SERPL-MCNC: 13 MG/DL (ref 8–23)
CALCIUM SERPL-MCNC: 9.6 MG/DL (ref 8.7–10.5)
CHLORIDE SERPL-SCNC: 107 MMOL/L (ref 95–110)
CHOLEST SERPL-MCNC: 204 MG/DL (ref 120–199)
CHOLEST/HDLC SERPL: 2.6 {RATIO} (ref 2–5)
CO2 SERPL-SCNC: 27 MMOL/L (ref 23–29)
CREAT SERPL-MCNC: 1 MG/DL (ref 0.5–1.4)
DIFFERENTIAL METHOD BLD: NORMAL
EOSINOPHIL # BLD AUTO: 0.3 K/UL (ref 0–0.5)
EOSINOPHIL NFR BLD: 4.9 % (ref 0–8)
ERYTHROCYTE [DISTWIDTH] IN BLOOD BY AUTOMATED COUNT: 13.2 % (ref 11.5–14.5)
EST. GFR  (NO RACE VARIABLE): >60 ML/MIN/1.73 M^2
ESTIMATED AVG GLUCOSE: 117 MG/DL (ref 68–131)
GLUCOSE SERPL-MCNC: 87 MG/DL (ref 70–110)
HBA1C MFR BLD: 5.7 % (ref 4–5.6)
HCT VFR BLD AUTO: 40.5 % (ref 37–48.5)
HDLC SERPL-MCNC: 77 MG/DL (ref 40–75)
HDLC SERPL: 37.7 % (ref 20–50)
HGB BLD-MCNC: 13 G/DL (ref 12–16)
IMM GRANULOCYTES # BLD AUTO: 0.01 K/UL (ref 0–0.04)
IMM GRANULOCYTES NFR BLD AUTO: 0.2 % (ref 0–0.5)
LDLC SERPL CALC-MCNC: 117.4 MG/DL (ref 63–159)
LYMPHOCYTES # BLD AUTO: 2.3 K/UL (ref 1–4.8)
LYMPHOCYTES NFR BLD: 41.1 % (ref 18–48)
MCH RBC QN AUTO: 28 PG (ref 27–31)
MCHC RBC AUTO-ENTMCNC: 32.1 G/DL (ref 32–36)
MCV RBC AUTO: 87 FL (ref 82–98)
MONOCYTES # BLD AUTO: 0.4 K/UL (ref 0.3–1)
MONOCYTES NFR BLD: 7.5 % (ref 4–15)
NEUTROPHILS # BLD AUTO: 2.6 K/UL (ref 1.8–7.7)
NEUTROPHILS NFR BLD: 45.4 % (ref 38–73)
NONHDLC SERPL-MCNC: 127 MG/DL
NRBC BLD-RTO: 0 /100 WBC
PLATELET # BLD AUTO: 348 K/UL (ref 150–450)
PMV BLD AUTO: 9.9 FL (ref 9.2–12.9)
POTASSIUM SERPL-SCNC: 4.2 MMOL/L (ref 3.5–5.1)
PROT SERPL-MCNC: 7.4 G/DL (ref 6–8.4)
RBC # BLD AUTO: 4.65 M/UL (ref 4–5.4)
SODIUM SERPL-SCNC: 141 MMOL/L (ref 136–145)
TRIGL SERPL-MCNC: 48 MG/DL (ref 30–150)
TSH SERPL DL<=0.005 MIU/L-ACNC: 2.07 UIU/ML (ref 0.4–4)
URATE SERPL-MCNC: 5.5 MG/DL (ref 2.4–5.7)
WBC # BLD AUTO: 5.7 K/UL (ref 3.9–12.7)

## 2024-05-27 PROCEDURE — 3288F FALL RISK ASSESSMENT DOCD: CPT | Mod: CPTII,S$GLB,, | Performed by: INTERNAL MEDICINE

## 2024-05-27 PROCEDURE — 1101F PT FALLS ASSESS-DOCD LE1/YR: CPT | Mod: CPTII,S$GLB,, | Performed by: INTERNAL MEDICINE

## 2024-05-27 PROCEDURE — 84550 ASSAY OF BLOOD/URIC ACID: CPT | Performed by: INTERNAL MEDICINE

## 2024-05-27 PROCEDURE — 99397 PER PM REEVAL EST PAT 65+ YR: CPT | Mod: S$GLB,,, | Performed by: INTERNAL MEDICINE

## 2024-05-27 PROCEDURE — 36415 COLL VENOUS BLD VENIPUNCTURE: CPT | Performed by: INTERNAL MEDICINE

## 2024-05-27 PROCEDURE — 85025 COMPLETE CBC W/AUTO DIFF WBC: CPT | Performed by: INTERNAL MEDICINE

## 2024-05-27 PROCEDURE — 84443 ASSAY THYROID STIM HORMONE: CPT | Performed by: INTERNAL MEDICINE

## 2024-05-27 PROCEDURE — 83036 HEMOGLOBIN GLYCOSYLATED A1C: CPT | Performed by: INTERNAL MEDICINE

## 2024-05-27 PROCEDURE — 3078F DIAST BP <80 MM HG: CPT | Mod: CPTII,S$GLB,, | Performed by: INTERNAL MEDICINE

## 2024-05-27 PROCEDURE — 1159F MED LIST DOCD IN RCRD: CPT | Mod: CPTII,S$GLB,, | Performed by: INTERNAL MEDICINE

## 2024-05-27 PROCEDURE — 3074F SYST BP LT 130 MM HG: CPT | Mod: CPTII,S$GLB,, | Performed by: INTERNAL MEDICINE

## 2024-05-27 PROCEDURE — 80061 LIPID PANEL: CPT | Performed by: INTERNAL MEDICINE

## 2024-05-27 PROCEDURE — 99999 PR PBB SHADOW E&M-EST. PATIENT-LVL IV: CPT | Mod: PBBFAC,,, | Performed by: INTERNAL MEDICINE

## 2024-05-27 PROCEDURE — 3008F BODY MASS INDEX DOCD: CPT | Mod: CPTII,S$GLB,, | Performed by: INTERNAL MEDICINE

## 2024-05-27 PROCEDURE — 82306 VITAMIN D 25 HYDROXY: CPT | Performed by: INTERNAL MEDICINE

## 2024-05-27 PROCEDURE — 1126F AMNT PAIN NOTED NONE PRSNT: CPT | Mod: CPTII,S$GLB,, | Performed by: INTERNAL MEDICINE

## 2024-05-27 PROCEDURE — 80053 COMPREHEN METABOLIC PANEL: CPT | Performed by: INTERNAL MEDICINE

## 2024-05-27 RX ORDER — SPIRONOLACTONE 25 MG/1
25 TABLET ORAL DAILY
Qty: 90 TABLET | Refills: 3 | Status: SHIPPED | OUTPATIENT
Start: 2024-05-27

## 2024-05-27 RX ORDER — ZOSTER VACCINE RECOMBINANT, ADJUVANTED 50 MCG/0.5
0.5 KIT INTRAMUSCULAR ONCE
Qty: 1 EACH | Refills: 0 | Status: SHIPPED | OUTPATIENT
Start: 2024-05-28 | End: 2024-05-28

## 2024-05-27 RX ORDER — ALLOPURINOL 100 MG/1
100 TABLET ORAL DAILY
Qty: 90 TABLET | Refills: 3 | Status: SHIPPED | OUTPATIENT
Start: 2024-05-27

## 2024-05-27 RX ORDER — NETARSUDIL AND LATANOPROST OPHTHALMIC SOLUTION, 0.02%/0.005% .2; .05 MG/ML; MG/ML
1 SOLUTION/ DROPS OPHTHALMIC; TOPICAL DAILY
COMMUNITY
Start: 2024-05-10

## 2024-05-27 RX ORDER — ZOSTER VACCINE RECOMBINANT, ADJUVANTED 50 MCG/0.5
0.5 KIT INTRAMUSCULAR ONCE
Qty: 1 EACH | Refills: 0 | Status: SHIPPED | OUTPATIENT
Start: 2024-07-15 | End: 2024-07-15

## 2024-05-27 RX ORDER — ERGOCALCIFEROL 1.25 MG/1
50000 CAPSULE ORAL
Qty: 12 CAPSULE | Refills: 3 | Status: SHIPPED | OUTPATIENT
Start: 2024-05-27

## 2024-05-27 RX ORDER — ATENOLOL 50 MG/1
50 TABLET ORAL DAILY
Qty: 90 TABLET | Refills: 3 | Status: SHIPPED | OUTPATIENT
Start: 2024-05-27 | End: 2025-05-27

## 2024-05-27 NOTE — PROGRESS NOTES
INTERNAL MEDICINE CLINIC  Follow-up Visit Progress Note     PRESENTING HISTORY     PCP: Shaq Sheppard MD    Last Clinic Visit with me:  3-2-2023    Current Chief Complaint/Problem:    Chief Complaint   Patient presents with    Annual Exam       History of Present Illness & ROS: Ms. Razia Keyes is a 69 y.o. female.    No gout.    Review of Systems:  Answers submitted by the patient for this visit:  Review of Systems Questionnaire (Submitted on 5/21/2024)  activity change: No  unexpected weight change: No  neck pain: No  hearing loss: No  rhinorrhea: No  trouble swallowing: No  eye discharge: No  visual disturbance: No  chest tightness: No  wheezing: No  chest pain: No  palpitations: No  blood in stool: No  constipation: No  vomiting: No  diarrhea: No  polydipsia: No  polyuria: No  difficulty urinating: No  hematuria: No  menstrual problem: No  dysuria: No  joint swelling: No  arthralgias: No  headaches: No  weakness: No  confusion: No  dysphoric mood: No      PAST HISTORY:     Past Medical History:   Diagnosis Date    BMI 33.0-33.9,adult 1/7/2021    Elevated HDL 02/03/2015    Essential hypertension 11/27/2012    Hyperuricemia 1/7/2021    Obstructive sleep apnea on CPAP     Podagra 1/7/2021    Prediabetes 8/1/2020    Mild.Diet control.    Primary open angle glaucoma (POAG) 10/17/2018    Seasonal allergic rhinitis due to pollen 10/17/2018    Tubular adenoma of colon (cecum) 12/3/2018    Last C-scope .  Next in 11- 2023    Vaccine refused by parent 8/6/2019    Vitamin D deficiency 10/17/2018       Past Surgical History:   Procedure Laterality Date    COLONOSCOPY  2007    Normal 2007. She should return in 10 years    COLONOSCOPY N/A 11/21/2018    Procedure: COLONOSCOPY;  Surgeon: Brody August MD;  Location: [address]);  Service: Endoscopy;  Laterality: N/A;    HYSTERECTOMY      Age 27       Family History   Problem Relation Name Age of Onset    Lymphoma Father      Diabetes Mother      Dementia  Mother      Anesthesia problems Maternal Uncle      Sleep apnea Brother      Gout Brother      Allergic rhinitis Neg Hx      Allergies Neg Hx      Angioedema Neg Hx      Asthma Neg Hx      Atopy Neg Hx      Eczema Neg Hx      Immunodeficiency Neg Hx      Rhinitis Neg Hx      Urticaria Neg Hx         Social History     Socioeconomic History    Marital status: Single    Number of children: 0   Occupational History     Employer: [employer]   Tobacco Use    Smoking status: Never    Smokeless tobacco: Never    Tobacco comments:     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 She exercises by walking about at school and in the stairways.   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      Social Determinants of Health     Financial Resource Strain: Low Risk  (3/1/2023)    Overall Financial Resource Strain (CARDIA)     Difficulty of Paying Living Expenses: Not very hard   Food Insecurity: No Food Insecurity (3/1/2023)    Hunger Vital Sign     Worried About Running Out of Food in the Last Year: Never true     Ran Out of Food in the Last Year: Never true   Transportation Needs: No Transportation Needs (3/1/2023)    PRAPARE - Transportation     Lack of Transportation (Medical): No     Lack of Transportation (Non-Medical): No   Physical Activity: Insufficiently Active (5/21/2024)    Exercise Vital Sign     Days of Exercise per Week: 5 days     Minutes of Exercise per Session: 20 min   Stress: No Stress Concern Present (5/21/2024)    [employer] [place] [employer] - Occupational Stress Questionnaire     Feeling of Stress : Not at all   Housing Stability: Low Risk  (3/1/2023)    Housing Stability Vital Sign     Unable to Pay for Housing in the Last Year: No     Number of  Places Lived in the Last Year: 1     Unstable Housing in the Last Year: No       MEDICATIONS & ALLERGIES:     Current Outpatient Medications on File Prior to Visit   Medication Sig Dispense Refill    clobetasoL (TEMOVATE) 0.05 % cream Apply topically 2 (two) times daily. 60 g 11    dorzolamide-timolol 2-0.5% (COSOPT) 22.3-6.8 mg/mL ophthalmic solution Place 1 drop into both eyes 2 (two) times daily.      fluticasone propionate (FLONASE) 50 mcg/actuation nasal spray USE 1 SPRAY (50 MCG TOTAL) IN EACH NOSTRIL ONCE DAILY 16 mL 0    hydroquinone 4 % Crea Apply topically 2 (two) times daily. 57 g 2    ibuprofen (ADVIL,MOTRIN) 600 MG tablet Take 1 tablet (600 mg total) by mouth every 6 (six) hours as needed for Pain. 30 tablet 0    levocetirizine (XYZAL) 5 MG tablet Take 1 tablet (5 mg total) by mouth every evening. 30 tablet 11           allopurinoL (ZYLOPRIM) 100 MG tablet Take 1 tablet (100 mg total) by mouth once daily. 90 tablet 0    atenoloL (TENORMIN) 50 MG tablet Take 1 tablet (50 mg total) by mouth once daily. 90 tablet 0    ergocalciferol (VITAMIN D2) 50,000 unit Cap Take 1 capsule (50,000 Units total) by mouth every 7 days. 12 capsule 0           spironolactone (ALDACTONE) 25 MG tablet Take 1 tablet (25 mg total) by mouth once daily. 90 tablet 0     No current facility-administered medications on file prior to visit.        Review of patient's allergies indicates:  No Known Allergies    Medications Reconciliation:   I have reconciled the patient's home medications and discharge medications with the patient/family. I have updated all changes.  Refer to After-Visit Medication List.    OBJECTIVE:     Vital Signs:  Vitals:    05/27/24 0834   BP: 110/68   Pulse: 63     Wt Readings from Last 3 Encounters:   05/27/24 0834 76.6 kg (168 lb 14 oz)   12/23/23 1230 85 kg (187 lb 6.3 oz)   03/02/23 1203 85 kg (187 lb 6.3 oz)     Body mass index is 29.91 kg/m².     Physical Exam:  General: Well developed, well nourished. No  distress.  HEENT: Head is normocephalic, atraumatic; ears are normal.    Eyes: Clear conjunctiva.  Neck: Supple, symmetrical neck; trachea midline.  Lungs: Clear to auscultation bilaterally and normal respiratory effort.  Cardiovascular: Heart with regular rate and rhythm.    Extremities: No LE edema. Pulses 2+ and symmetric.   Abdomen: Abdomen is soft, non-tender non-distended with normal bowel sounds.  Skin: Skin color, texture, turgor normal. No rashes.  Musculoskeletal: Normal gait.   Lymph Nodes: No cervical or supraclavicular adenopathy.  Neurologic: Normal strength and tone. No focal numbness or weakness.   Psychiatric: Normal affect. Alert.      Laboratory  Lab Results   Component Value Date    WBC 5.65 06/17/2022    HGB 13.7 06/17/2022    HCT 42.7 06/17/2022     06/17/2022    CHOL 200 (H) 06/17/2022    TRIG 58 06/17/2022    HDL 76 (H) 06/17/2022    ALT 10 03/02/2023    AST 48 (H) 03/02/2023     03/02/2023    K 4.4 03/02/2023     03/02/2023    CREATININE 1.0 03/02/2023    BUN 11 03/02/2023    CO2 29 03/02/2023    TSH 2.593 06/17/2022    HGBA1C 5.6 06/17/2022       ASSESSMENT & PLAN:     Annual physical exam  - Reviewed and updated past and current medical problems.  Discussed treatment of current medical problems    -     Lipid Panel; Future; Expected date: 05/27/2024  -     CBC Auto Differential; Future; Expected date: 05/27/2024  -     Comprehensive Metabolic Panel; Future; Expected date: 05/27/2024  -     TSH; Future; Expected date: 05/27/2024  -     Hemoglobin A1C; Future; Expected date: 05/27/2024  -     Vitamin D; Future; Expected date: 11/23/2024  -     URIC ACID; Future; Expected date: 05/27/2024    Essential hypertension  - Tenoretic was stopped due to Gout (thiazide diuretic) and low potassium.     Now well controlled on:     -     spironolactone (ALDACTONE) 25 MG tablet; Take 1 tablet (25 mg total) by mouth once daily.     -     atenoloL (TENORMIN) 50 MG tablet; Take 1 tablet  (50 mg total) by mouth once daily.        Podagra  Hyperuricemia  -     Uric acid (8-1-20) 9.7. Decreased to 4 (7/22/21)        On Allopurinol 100 mg daily since then.         No gout attack since.     Seasonal allergic rhinitis due to pollen  - On PRN Xyzal and Flonase PRN.     Primary open angle glaucoma (POAG) of both eyes, mild stage   She is followed by outside eye doctor   On  travoprost / lantanoprost (one of them depending cost)     Vitamin D deficiency  - On Vitamin D2 50,000 weekly.  -     ergocalciferol (VITAMIN D2) 50,000 unit Cap; Take 1 capsule (50,000 Units total) by mouth every 7 days.       Tubular adenoma of colon (cecum)  Last C-scope .  Next in 11- 2023    Order:   -     Ambulatory referral/consult to Endo Procedure ; Future; Expected date: 05/28/2024    ZENAIDA (obstructive sleep apnea) - on CPAP.  - On CPAP doing well.     Vaccine refused by parent  - Patient does not want any vaccine.     BMI 29  Last Body mass index is 33.19 kg/m².  Today: Body mass index is 29.91 kg/m².    - Has lost with diet.  Her brother who is DM is staying with her.     Other eczema  -     clobetasoL (TEMOVATE) 0.05 % cream; Apply topically 2 (two) times daily.      Solar lentigo  -     hydroquinone 4 % Crea; Apply topically 2 (two) times daily.         Preventive Health Maintenance:  Refused Tdap, Shingle vaccine, Pneumovax and Flu vaccine.        Eye: Dr. Mckenzie Rodriguez ([facility])     Encounter for screening mammogram for breast cancer  -     Mammo Digital Screening Bilat; Future; Expected date: 07/29/2024     Need for shingles vaccine - she is rethinking about getting the Shingles vaccine due to seeing other with shingles  -     varicella-zoster gE-AS01B, PF, (SHINGRIX, PF,) 50 mcg/0.5 mL injection; Inject 0.5 mLs into the muscle once. for 1 dose  Dispense: 1 each; Refill: 0  -     varicella-zoster gE-AS01B, PF, (SHINGRIX, PF,) 50 mcg/0.5 mL injection; Inject 0.5 mLs into the muscle once. for 1  dose  Dispense: 1 each; Refill: 0    Return to Clinic for Follow Up with me: 6 months.     Scheduled Follow-up :  Future Appointments   Date Time Provider Department Center   5/27/2024  9:10 AM LAB[facility] IM David Whitlock [facility]         After Visit Medication List :     Medication List            Accurate as of May 27, 2024  8:56 AM. If you have any questions, ask your nurse or doctor.                START taking these medications      * SHINGRIX (PF) 50 mcg/0.5 mL injection  Generic drug: varicella-zoster gE-AS01B (PF)  Inject 0.5 mLs into the muscle once. for 1 dose  Start taking on: May 28, 2024  Started by: Shaq Sheppard MD     * SHINGRIX (PF) 50 mcg/0.5 mL injection  Generic drug: varicella-zoster gE-AS01B (PF)  Inject 0.5 mLs into the muscle once. for 1 dose  Start taking on: July 15, 2024  Started by: Shaq Sheppard MD           * This list has 2 medication(s) that are the same as other medications prescribed for you. Read the directions carefully, and ask your doctor or other care provider to review them with you.                CONTINUE taking these medications      allopurinoL 100 MG tablet  Commonly known as: ZYLOPRIM  Take 1 tablet (100 mg total) by mouth once daily.     atenoloL 50 MG tablet  Commonly known as: TENORMIN  Take 1 tablet (50 mg total) by mouth once daily.     clobetasoL 0.05 % cream  Commonly known as: TEMOVATE  Apply topically 2 (two) times daily.     dorzolamide-timolol 2-0.5% 22.3-6.8 mg/mL ophthalmic solution  Commonly known as: COSOPT     ergocalciferol 50,000 unit Cap  Commonly known as: VITAMIN D2  Take 1 capsule (50,000 Units total) by mouth every 7 days.     fluticasone propionate 50 mcg/actuation nasal spray  Commonly known as: FLONASE  USE 1 SPRAY (50 MCG TOTAL) IN EACH NOSTRIL ONCE DAILY     hydroquinone 4 % Crea  Apply topically 2 (two) times daily.     ibuprofen 600 MG tablet  Commonly known as: ADVIL,MOTRIN  Take 1 tablet (600 mg total) by mouth every 6  (six) hours as needed for Pain.     levocetirizine 5 MG tablet  Commonly known as: XYZAL  Take 1 tablet (5 mg total) by mouth every evening.     ROCKLATAN 0.02-0.005 % Drop  Generic drug: netarsudiL-latanoprost     spironolactone 25 MG tablet  Commonly known as: ALDACTONE  Take 1 tablet (25 mg total) by mouth once daily.            STOP taking these medications      hydrOXYzine HCL 25 MG tablet  Commonly known as: ATARAX  Stopped by: Shaq Sheppard MD               Where to Get Your Medications        These medications were sent to [facility]/pharmacy #[id number] - [place] - [address]  [address] [address]      Phone: [phone]   allopurinoL 100 MG tablet  atenoloL 50 MG tablet  ergocalciferol 50,000 unit Cap  SHINGRIX (PF) 50 mcg/0.5 mL injection  SHINGRIX (PF) 50 mcg/0.5 mL injection  spironolactone 25 MG tablet         Signing Physician:  Shaq Sheppard MD

## 2024-06-03 ENCOUNTER — CLINICAL SUPPORT (OUTPATIENT)
Dept: ENDOSCOPY | Facility: HOSPITAL | Age: 69
End: 2024-06-03
Attending: INTERNAL MEDICINE
Payer: COMMERCIAL

## 2024-06-03 DIAGNOSIS — Z12.11 SCREENING FOR MALIGNANT NEOPLASM OF COLON: ICD-10-CM

## 2024-06-03 DIAGNOSIS — D12.6 TUBULAR ADENOMA OF COLON: ICD-10-CM

## 2024-06-03 NOTE — PLAN OF CARE
We attempted to reach you for your scheduled PAT appointment to schedule your colonoscopy. Left voicemail message. Please contact Endoscopy Scheduling at # [phone].

## 2024-06-06 ENCOUNTER — TELEPHONE (OUTPATIENT)
Dept: ENDOSCOPY | Facility: HOSPITAL | Age: 69
End: 2024-06-06
Payer: COMMERCIAL

## 2024-06-06 VITALS — HEIGHT: 63 IN | BODY MASS INDEX: 31.18 KG/M2 | WEIGHT: 176 LBS

## 2024-06-06 DIAGNOSIS — D12.6 TUBULAR ADENOMA OF COLON: ICD-10-CM

## 2024-06-06 DIAGNOSIS — Z12.11 SCREENING FOR MALIGNANT NEOPLASM OF COLON: Primary | ICD-10-CM

## 2024-06-06 RX ORDER — SODIUM, POTASSIUM,MAG SULFATES 17.5-3.13G
1 SOLUTION, RECONSTITUTED, ORAL ORAL DAILY
Qty: 1 KIT | Refills: 0 | Status: SHIPPED | OUTPATIENT
Start: 2024-06-06 | End: 2024-06-08

## 2024-06-06 NOTE — TELEPHONE ENCOUNTER
Spoke to patient to schedule procedure(s) Colonoscopy       Physician to perform procedure(s) Dr. ZELDA Elmore  Date of Procedure (s) 6/13/24  Arrival Time 10:20 AM  Time of Procedure(s) 11:20 AM   Location of Procedure(s) [place] [address] [facility]  Type of Rx Prep sent to patient: Suprep  Instructions provided to patient via [facility] and Emailed to [email or web address]    Patient was informed on the following information and verbalized understanding. Screening questionnaire reviewed with patient and complete. If procedure requires anesthesia, a responsible adult needs to be present to accompany the patient home, patient cannot drive after receiving anesthesia. Appointment details are tentative, especially check-in time. Patient will receive a prep-op call 7 days prior to confirm check-in time for procedure. If applicable the patient should contact their pharmacy to verify Rx for procedure prep is ready for pick-up. Patient was advised to call the scheduling department at [phone] if pharmacy states no Rx is available. Patient was advised to call the endoscopy scheduling department if any questions or concerns arise.      [facility]oscopy Scheduling Department

## 2024-06-11 ENCOUNTER — TELEPHONE (OUTPATIENT)
Dept: ENDOSCOPY | Facility: HOSPITAL | Age: 69
End: 2024-06-11
Payer: COMMERCIAL

## 2024-06-11 NOTE — TELEPHONE ENCOUNTER
Pt returned call to confirm colonoscopy. Pt has prep and instructions and confirmed ride. Verified if any GLP1's and blood thinners.Pre-call complete, Pt does not have any further questions. Arrival time: 10:20 AM. Instructions e-mailed to [email or web address]

## 2024-06-12 ENCOUNTER — TELEPHONE (OUTPATIENT)
Dept: ENDOSCOPY | Facility: HOSPITAL | Age: 69
End: 2024-06-12
Payer: COMMERCIAL

## 2024-06-12 ENCOUNTER — PATIENT MESSAGE (OUTPATIENT)
Dept: ENDOSCOPY | Facility: HOSPITAL | Age: 69
End: 2024-06-12
Payer: COMMERCIAL

## 2024-06-12 NOTE — TELEPHONE ENCOUNTER
Patient informed that is she having difficulty logging in her portal. Reviewed prep instructions and sent a copy to her email listed in demographics.

## 2024-06-12 NOTE — TELEPHONE ENCOUNTER
Returned patient call that was left on voicemail no answer left voicemail asking patient to call us back @ [phone]

## 2024-06-13 ENCOUNTER — ANESTHESIA EVENT (OUTPATIENT)
Dept: ENDOSCOPY | Facility: HOSPITAL | Age: 69
End: 2024-06-13
Payer: COMMERCIAL

## 2024-06-13 ENCOUNTER — HOSPITAL ENCOUNTER (OUTPATIENT)
Facility: HOSPITAL | Age: 69
Discharge: HOME | End: 2024-06-13
Attending: COLON & RECTAL SURGERY | Admitting: COLON & RECTAL SURGERY
Payer: COMMERCIAL

## 2024-06-13 ENCOUNTER — ANESTHESIA (OUTPATIENT)
Dept: ENDOSCOPY | Facility: HOSPITAL | Age: 69
End: 2024-06-13
Payer: COMMERCIAL

## 2024-06-13 VITALS
TEMPERATURE: 98 F | HEART RATE: 84 BPM | HEIGHT: 63 IN | BODY MASS INDEX: 30.12 KG/M2 | DIASTOLIC BLOOD PRESSURE: 70 MMHG | WEIGHT: 170 LBS | RESPIRATION RATE: 16 BRPM | SYSTOLIC BLOOD PRESSURE: 124 MMHG | OXYGEN SATURATION: 100 %

## 2024-06-13 DIAGNOSIS — Z86.010 HISTORY OF COLON POLYPS: ICD-10-CM

## 2024-06-13 PROCEDURE — 25000003 PHARM REV CODE 250: Performed by: NURSE ANESTHETIST, CERTIFIED REGISTERED

## 2024-06-13 PROCEDURE — G0105 COLORECTAL SCRN; HI RISK IND: HCPCS | Mod: ,,, | Performed by: COLON & RECTAL SURGERY

## 2024-06-13 PROCEDURE — 37000008 HC ANESTHESIA 1ST 15 MINUTES: Performed by: COLON & RECTAL SURGERY

## 2024-06-13 PROCEDURE — 63600175 PHARM REV CODE 636 W HCPCS: Performed by: NURSE ANESTHETIST, CERTIFIED REGISTERED

## 2024-06-13 PROCEDURE — G0105 COLORECTAL SCRN; HI RISK IND: HCPCS | Performed by: COLON & RECTAL SURGERY

## 2024-06-13 PROCEDURE — 25000003 PHARM REV CODE 250: Performed by: COLON & RECTAL SURGERY

## 2024-06-13 PROCEDURE — 37000009 HC ANESTHESIA EA ADD 15 MINS: Performed by: COLON & RECTAL SURGERY

## 2024-06-13 RX ORDER — PROPOFOL 10 MG/ML
VIAL (ML) INTRAVENOUS CONTINUOUS PRN
Status: DISCONTINUED | OUTPATIENT
Start: 2024-06-13 | End: 2024-06-13

## 2024-06-13 RX ORDER — PROPOFOL 10 MG/ML
VIAL (ML) INTRAVENOUS
Status: DISCONTINUED | OUTPATIENT
Start: 2024-06-13 | End: 2024-06-13

## 2024-06-13 RX ORDER — SODIUM CHLORIDE 9 MG/ML
INJECTION, SOLUTION INTRAVENOUS CONTINUOUS
Status: DISCONTINUED | OUTPATIENT
Start: 2024-06-13 | End: 2024-06-13 | Stop reason: HOSPADM

## 2024-06-13 RX ORDER — LIDOCAINE HYDROCHLORIDE 20 MG/ML
INJECTION INTRAVENOUS
Status: DISCONTINUED | OUTPATIENT
Start: 2024-06-13 | End: 2024-06-13

## 2024-06-13 RX ADMIN — PROPOFOL 150 MCG/KG/MIN: 10 INJECTION, EMULSION INTRAVENOUS at 11:06

## 2024-06-13 RX ADMIN — LIDOCAINE HYDROCHLORIDE 50 MG: 20 INJECTION INTRAVENOUS at 11:06

## 2024-06-13 RX ADMIN — PROPOFOL 70 MG: 10 INJECTION, EMULSION INTRAVENOUS at 11:06

## 2024-06-13 RX ADMIN — SODIUM CHLORIDE: 0.9 INJECTION, SOLUTION INTRAVENOUS at 11:06

## 2024-06-13 NOTE — H&P
COLONOSCOPY HISTORY AND PHYSICAL EXAM    Procedure : Colonoscopy      INDICATIONS: personal history of colon polyps    Family Hx of CRC: denies    Last Colonoscopy:  11/21/18  Findings: cecal TA  Sedation Problems: NO  Fam Hx of Sedation Problems: NO     Past Medical History:   Diagnosis Date    BMI 33.0-33.9,adult 1/7/2021    Elevated HDL 02/03/2015    Essential hypertension 11/27/2012    Hyperuricemia 1/7/2021    Obstructive sleep apnea on CPAP     Podagra 1/7/2021    Prediabetes 8/1/2020    Mild.Diet control.    Primary open angle glaucoma (POAG) 10/17/2018    Seasonal allergic rhinitis due to pollen 10/17/2018    Tubular adenoma of colon (cecum) 12/3/2018    Last C-scope .  Next in 11- 2023    Vaccine refused by parent 8/6/2019    Vitamin D deficiency 10/17/2018     Family History   Problem Relation Name Age of Onset    Lymphoma Father      Diabetes Mother      Dementia Mother      Anesthesia problems Maternal Uncle      Sleep apnea Brother      Gout Brother      Allergic rhinitis Neg Hx      Allergies Neg Hx      Angioedema Neg Hx      Asthma Neg Hx      Atopy Neg Hx      Eczema Neg Hx      Immunodeficiency Neg Hx      Rhinitis Neg Hx      Urticaria Neg Hx       Social History     Socioeconomic History    Marital status: Single    Number of children: 0   Occupational History     Employer: [employer]   Tobacco Use    Smoking status: Never    Smokeless tobacco: Never    Tobacco comments:     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 She exercises by walking about at school and in the stairways.   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      Social Determinants of Health     Financial Resource Strain: Low Risk  (3/1/2023)    Overall Financial Resource  "Strain (CARDIA)     Difficulty of Paying Living Expenses: Not very hard   Food Insecurity: No Food Insecurity (3/1/2023)    Hunger Vital Sign     Worried About Running Out of Food in the Last Year: Never true     Ran Out of Food in the Last Year: Never true   Transportation Needs: No Transportation Needs (3/1/2023)    PRAPARE - Transportation     Lack of Transportation (Medical): No     Lack of Transportation (Non-Medical): No   Physical Activity: Insufficiently Active (5/21/2024)    Exercise Vital Sign     Days of Exercise per Week: 5 days     Minutes of Exercise per Session: 20 min   Stress: No Stress Concern Present (5/21/2024)    [employer] [place] [employer] [employer] - Occupational Stress Questionnaire     Feeling of Stress : Not at all   Housing Stability: Low Risk  (3/1/2023)    Housing Stability Vital Sign     Unable to Pay for Housing in the Last Year: No     Number of Places Lived in the Last Year: 1     Unstable Housing in the Last Year: No       Review of Systems - Negative except   Respiratory ROS: no dyspnea  Cardiovascular ROS: no exertional chest pain  Gastrointestinal ROS: NO abdominal discomfort,  NO rectal bleeding  Musculoskeletal ROS: no muscular pain  Neurological ROS: no recent stroke    Physical Exam:  /76 (BP Location: Left arm, Patient Position: Lying)   Pulse 102   Temp 98.1 °F (36.7 °C) (Temporal)   Resp 18   Ht 5' 3" (1.6 m)   Wt 77.1 kg (170 lb)   SpO2 97%   Breastfeeding No   BMI 30.11 kg/m²     General: Alert and oriented x 3. No acute distress. Well-nourished.  HEENT: EOMI. Sclera anicteric. Moist mucous membranes. No scleral icterus. No cervical lymphadenopathy.  Lungs: Clear to auscultation bilaterally. No accessory muscle use.  Cardiac: Regular rate and rhythm. No murmur. No JVD.  Abdomen: soft, non tender   Extremities: No edema. Non-tender.  Skin: No rashes or lesions. Warm.  Neuro: No focal neurological deficits. CN II-XII grossly intact, but not individually " tested.  Psych: Cooperative. Appropriate mood and affect.    ASA:  II    PLAN  COLONOSCOPY.    SedationPlan :MAC    The details of the procedure, the possible need for biopsy or polypectomy and the potential risks including bleeding, perforation, missed polyps were discussed in detail.    Vini Barreto MD  Colon & Rectal Surgery Fellow

## 2024-06-13 NOTE — ANESTHESIA PREPROCEDURE EVALUATION
06/13/2024  Pre-operative evaluation for Procedure(s) (LRB):  COLONOSCOPY (N/A)    Razia Keyes is a 69 y.o. female with PMH of HTN, ZENAIDA on CPAP, and HLD, and primary open angle glaucoma.     Past Medical History:   Diagnosis Date    BMI 33.0-33.9,adult 1/7/2021    Elevated HDL 02/03/2015    Essential hypertension 11/27/2012    Hyperuricemia 1/7/2021    Obstructive sleep apnea on CPAP     Podagra 1/7/2021    Prediabetes 8/1/2020    Mild.Diet control.    Primary open angle glaucoma (POAG) 10/17/2018    Seasonal allergic rhinitis due to pollen 10/17/2018    Tubular adenoma of colon (cecum) 12/3/2018    Last C-scope .  Next in 11- 2023    Vaccine refused by parent 8/6/2019    Vitamin D deficiency 10/17/2018       Review of patient's allergies indicates:  No Known Allergies    No current facility-administered medications on file prior to encounter.     Current Outpatient Medications on File Prior to Encounter   Medication Sig Dispense Refill    allopurinoL (ZYLOPRIM) 100 MG tablet Take 1 tablet (100 mg total) by mouth once daily. 90 tablet 3    atenoloL (TENORMIN) 50 MG tablet Take 1 tablet (50 mg total) by mouth once daily. 90 tablet 3    clobetasoL (TEMOVATE) 0.05 % cream Apply topically 2 (two) times daily. 60 g 11    dorzolamide-timolol 2-0.5% (COSOPT) 22.3-6.8 mg/mL ophthalmic solution Place 1 drop into both eyes 2 (two) times daily.      ergocalciferol (VITAMIN D2) 50,000 unit Cap Take 1 capsule (50,000 Units total) by mouth every 7 days. 12 capsule 3    fluticasone propionate (FLONASE) 50 mcg/actuation nasal spray USE 1 SPRAY (50 MCG TOTAL) IN EACH NOSTRIL ONCE DAILY 16 mL 0    hydroquinone 4 % Crea Apply topically 2 (two) times daily. 57 g 2    ibuprofen (ADVIL,MOTRIN) 600 MG tablet Take 1 tablet (600 mg total) by mouth every 6 (six) hours as needed for Pain. 30 tablet 0    levocetirizine (XYZAL)  "5 MG tablet Take 1 tablet (5 mg total) by mouth every evening. 30 tablet 11    ROCKLATAN 0.02-0.005 % Drop Place 1 drop into both eyes once daily.      spironolactone (ALDACTONE) 25 MG tablet Take 1 tablet (25 mg total) by mouth once daily. 90 tablet 3    [START ON 7/15/2024] varicella-zoster gE-AS01B, PF, (SHINGRIX, PF,) 50 mcg/0.5 mL injection Inject 0.5 mLs into the muscle once. for 1 dose 1 each 0       Past Surgical History:   Procedure Laterality Date    COLONOSCOPY      Normal . She should return in 10 years    COLONOSCOPY N/A 2018    Procedure: COLONOSCOPY;  Surgeon: Brody August MD;  Location: [facility] ([address] [place] [address]);  Service: Endoscopy;  Laterality: N/A;    HYSTERECTOMY      Age 27       CBC: No results for input(s): "WBC", "HGB", "HCT", "PLT" in the last 72 hours.    CMP: No results for input(s): "NA", "K", "CL", "CO2", "BUN", "CREATININE", "GLU", "MG", "PHOS", "CALCIUM", "ALBUMIN", "PROT", "ALKPHOS", "ALT", "AST", "BILITOT" in the last 72 hours.    COAGS  No results for input(s): "PT", "INR", "PROTIME", "APTT" in the last 72 hours.    BP Readings from Last 3 Encounters:   24 110/68   23 108/70   23 120/70       Diagnostic Studies:      EKD Echo:  No results found for this or any previous visit.        Pre-op Assessment    I have reviewed the Patient Summary Reports.     I have reviewed the Nursing Notes. I have reviewed the NPO Status.   I have reviewed the Medications.     Review of Systems  Anesthesia Hx:               Denies Personal Hx of Anesthesia complications.                    Cardiovascular:     Hypertension                                  Hypertension         Pulmonary:        Sleep Apnea     Obstructive Sleep Apnea (ZENAIDA).               Physical Exam  General: Alert, Cooperative and Oriented    Airway:  Mallampati: II   Mouth Opening: Normal  Neck ROM: Normal ROM        Anesthesia Plan  Type of Anesthesia, risks & benefits discussed:    Anesthesia " Type: Gen Natural Airway  Intra-op Monitoring Plan: Standard ASA Monitors  Post Op Pain Control Plan: multimodal analgesia  Induction:  IV  Airway Plan: Direct  Informed Consent: Informed consent signed with the Patient and all parties understand the risks and agree with anesthesia plan.  All questions answered.   ASA Score: 2  Day of Surgery Review of History & Physical: H&P Update referred to the surgeon/provider.    Ready For Surgery From Anesthesia Perspective.     .

## 2024-06-13 NOTE — ANESTHESIA POSTPROCEDURE EVALUATION
Anesthesia Post Evaluation    Patient: Razia Keyes    Procedure(s) Performed: Procedure(s) (LRB):  COLONOSCOPY (N/A)    Final Anesthesia Type: general      Patient location during evaluation: GI PACU  Patient participation: Yes- Able to Participate  Level of consciousness: awake and alert and oriented  Post-procedure vital signs: reviewed and stable  Pain management: adequate  Airway patency: patent    PONV status at discharge: No PONV  Anesthetic complications: no      Cardiovascular status: hemodynamically stable  Respiratory status: unassisted  Hydration status: euvolemic  Follow-up not needed.              Vitals Value Taken Time   /70 06/13/24 1235   Temp 36.6 °C (97.9 °F) 06/13/24 1208   Pulse 84 06/13/24 1235   Resp 16 06/13/24 1235   SpO2 100 % 06/13/24 1235         Event Time   Out of Recovery 12:43:26         Pain/Jacque Score: Jacque Score: 10 (6/13/2024 12:24 PM)

## 2024-06-13 NOTE — TRANSFER OF CARE
"Anesthesia Transfer of Care Note    Patient: Razia Keyes    Procedure(s) Performed: Procedure(s) (LRB):  COLONOSCOPY (N/A)    Patient location: GI    Anesthesia Type: general    Transport from OR: Transported from OR on room air with adequate spontaneous ventilation    Post pain: adequate analgesia    Post assessment: no apparent anesthetic complications and tolerated procedure well    Post vital signs: stable    Level of consciousness: awake, alert and oriented    Nausea/Vomiting: no nausea/vomiting    Complications: none    Transfer of care protocol was followed    Last vitals: Visit Vitals  BP (!) 100/55   Pulse 89   Temp 36.6 °C (97.9 °F)   Resp 16   Ht 5' 3" (1.6 m)   Wt 77.1 kg (170 lb)   SpO2 99%   Breastfeeding No   BMI 30.11 kg/m²     "

## 2024-06-13 NOTE — PROVATION PATIENT INSTRUCTIONS
Discharge Summary/Instructions after an Endoscopic Procedure  Patient Name: Razia Keyes  Patient MRN: [id number]  Patient YOB: 1955 Thursday, June 13, 2024  Rodney Elmore MD  Dear patient,  As a result of recent federal legislation (The Federal Cures Act), you may   receive lab or pathology results from your procedure in your [facility]   account before your physician is able to contact you. Your physician or   their representative will relay the results to you with their   recommendations at their soonest availability.  Thank you,  RESTRICTIONS:  During your procedure today, you received medications for sedation.  These   medications may affect your judgment, balance and coordination.  Therefore,   for 24 hours, you have the following restrictions:   - DO NOT drive a car, operate machinery, make legal/financial decisions,   sign important papers or drink alcohol.    ACTIVITY:  Today: no heavy lifting, straining or running due to procedural   sedation/anesthesia.  The following day: return to full activity including work.  DIET:  Eat and drink normally unless instructed otherwise.     TREATMENT FOR COMMON SIDE EFFECTS:  - Mild abdominal pain, nausea, belching, bloating or excessive gas:  rest,   eat lightly and use a heating pad.  - Sore Throat: treat with throat lozenges and/or gargle with warm salt   water.  - Because air was used during the procedure, expelling large amounts of air   from your rectum or belching is normal.  - If a bowel prep was taken, you may not have a bowel movement for 1-3 days.    This is normal.  SYMPTOMS TO WATCH FOR AND REPORT TO YOUR PHYSICIAN:  1. Abdominal pain or bloating, other than gas cramps.  2. Chest pain.  3. Back pain.  4. Signs of infection such as: chills or fever occurring within 24 hours   after the procedure.  5. Rectal bleeding, which would show as bright red, maroon, or black stools.   (A tablespoon of blood from the rectum is not serious, especially if    hemorrhoids are present.)  6. Vomiting.  7. Weakness or dizziness.  GO DIRECTLY TO THE NEAREST EMERGENCY ROOM IF YOU HAVE ANY OF THE FOLLOWING:      Difficulty breathing              Chills and/or fever over 101 F   Persistent vomiting and/or vomiting blood   Severe abdominal pain   Severe chest pain   Black, tarry stools   Bleeding- more than one tablespoon   Any other symptom or condition that you feel may need urgent attention  Your doctor recommends these additional instructions:  If any biopsies were taken, your doctors clinic will contact you in 1 to 2   weeks with any results.  - Discharge patient to home.   - Resume previous diet.   - Continue present medications.   - Repeat colonoscopy in 5 years for surveillance.   - Patient has a contact number available for emergencies.  The signs and   symptoms of potential delayed complications were discussed with the   patient.  Return to normal activities tomorrow.  Written discharge   instructions were provided to the patient.  For questions, problems or results please call your physician - Rodney Elmore MD at Work:  [phone].  [facility] [place], EMERGENCY ROOM PHONE NUMBER: [phone]  IF A COMPLICATION OR EMERGENCY SITUATION ARISES AND YOU ARE UNABLE TO REACH   YOUR PHYSICIAN - GO DIRECTLY TO THE EMERGENCY ROOM.  Rodney Elmore MD  6/13/2024 12:08:04 PM  This report has been verified and signed electronically.  Dear patient,  As a result of recent federal legislation (The Federal Cures Act), you may   receive lab or pathology results from your procedure in your [facility]   account before your physician is able to contact you. Your physician or   their representative will relay the results to you with their   recommendations at their soonest availability.  Thank you,  PROVATION

## 2024-07-15 ENCOUNTER — TELEPHONE (OUTPATIENT)
Dept: SLEEP MEDICINE | Facility: CLINIC | Age: 69
End: 2024-07-15
Payer: COMMERCIAL

## 2024-07-15 NOTE — TELEPHONE ENCOUNTER
Staff left a voice message for patient to schedule their follow up visit.   Johnna patient 2021      ----- Message from Natalia Diaz MA sent at 7/15/2024  4:11 PM CDT -----  Who called:  Pt   What is the request in detail:  Requesting new CPAP equipment   Can the clinic reply by [facility]? No    Would the patient rather a call back or a response via [facility]? Call back    Best call back number:  [phone]  Additional Information:    Thank you.

## 2024-12-30 ENCOUNTER — HOSPITAL ENCOUNTER (EMERGENCY)
Facility: OTHER | Age: 69
Discharge: HOME | End: 2024-12-30
Attending: EMERGENCY MEDICINE
Payer: COMMERCIAL

## 2024-12-30 VITALS
TEMPERATURE: 99 F | OXYGEN SATURATION: 95 % | SYSTOLIC BLOOD PRESSURE: 141 MMHG | DIASTOLIC BLOOD PRESSURE: 68 MMHG | HEART RATE: 78 BPM | RESPIRATION RATE: 18 BRPM

## 2024-12-30 DIAGNOSIS — R55 NEAR SYNCOPE: Primary | ICD-10-CM

## 2024-12-30 DIAGNOSIS — E86.0 DEHYDRATION: ICD-10-CM

## 2024-12-30 DIAGNOSIS — R42 DIZZINESS: ICD-10-CM

## 2024-12-30 LAB
ALBUMIN SERPL BCP-MCNC: 3.7 G/DL (ref 3.5–5.2)
ALP SERPL-CCNC: 100 U/L (ref 40–150)
ALT SERPL W/O P-5'-P-CCNC: 8 U/L (ref 10–44)
ANION GAP SERPL CALC-SCNC: 7 MMOL/L (ref 8–16)
AST SERPL-CCNC: 48 U/L (ref 10–40)
BASOPHILS # BLD AUTO: 0.03 K/UL (ref 0–0.2)
BASOPHILS NFR BLD: 0.5 % (ref 0–1.9)
BILIRUB SERPL-MCNC: 0.6 MG/DL (ref 0.1–1)
BUN SERPL-MCNC: 11 MG/DL (ref 8–23)
CALCIUM SERPL-MCNC: 9.8 MG/DL (ref 8.7–10.5)
CHLORIDE SERPL-SCNC: 105 MMOL/L (ref 95–110)
CO2 SERPL-SCNC: 28 MMOL/L (ref 23–29)
CREAT SERPL-MCNC: 1.1 MG/DL (ref 0.5–1.4)
DIFFERENTIAL METHOD BLD: ABNORMAL
EOSINOPHIL # BLD AUTO: 0.1 K/UL (ref 0–0.5)
EOSINOPHIL NFR BLD: 2.3 % (ref 0–8)
ERYTHROCYTE [DISTWIDTH] IN BLOOD BY AUTOMATED COUNT: 12.5 % (ref 11.5–14.5)
EST. GFR  (NO RACE VARIABLE): 54 ML/MIN/1.73 M^2
GLUCOSE SERPL-MCNC: 95 MG/DL (ref 70–110)
HCT VFR BLD AUTO: 42.1 % (ref 37–48.5)
HGB BLD-MCNC: 14.3 G/DL (ref 12–16)
IMM GRANULOCYTES # BLD AUTO: 0.01 K/UL (ref 0–0.04)
IMM GRANULOCYTES NFR BLD AUTO: 0.2 % (ref 0–0.5)
LYMPHOCYTES # BLD AUTO: 2 K/UL (ref 1–4.8)
LYMPHOCYTES NFR BLD: 31.6 % (ref 18–48)
MAGNESIUM SERPL-MCNC: 1.7 MG/DL (ref 1.6–2.6)
MCH RBC QN AUTO: 28.4 PG (ref 27–31)
MCHC RBC AUTO-ENTMCNC: 34 G/DL (ref 32–36)
MCV RBC AUTO: 84 FL (ref 82–98)
MONOCYTES # BLD AUTO: 0.5 K/UL (ref 0.3–1)
MONOCYTES NFR BLD: 8.4 % (ref 4–15)
NEUTROPHILS # BLD AUTO: 3.5 K/UL (ref 1.8–7.7)
NEUTROPHILS NFR BLD: 57 % (ref 38–73)
NRBC BLD-RTO: 0 /100 WBC
PLATELET # BLD AUTO: 350 K/UL (ref 150–450)
PMV BLD AUTO: 9.1 FL (ref 9.2–12.9)
POTASSIUM SERPL-SCNC: 4.4 MMOL/L (ref 3.5–5.1)
PROT SERPL-MCNC: 7.7 G/DL (ref 6–8.4)
RBC # BLD AUTO: 5.04 M/UL (ref 4–5.4)
SODIUM SERPL-SCNC: 140 MMOL/L (ref 136–145)
TROPONIN I SERPL DL<=0.01 NG/ML-MCNC: <0.006 NG/ML (ref 0–0.03)
WBC # BLD AUTO: 6.18 K/UL (ref 3.9–12.7)

## 2024-12-30 PROCEDURE — 83735 ASSAY OF MAGNESIUM: CPT | Performed by: NURSE PRACTITIONER

## 2024-12-30 PROCEDURE — 84484 ASSAY OF TROPONIN QUANT: CPT | Performed by: NURSE PRACTITIONER

## 2024-12-30 PROCEDURE — 99285 EMERGENCY DEPT VISIT HI MDM: CPT | Mod: 25

## 2024-12-30 PROCEDURE — 85025 COMPLETE CBC W/AUTO DIFF WBC: CPT | Performed by: NURSE PRACTITIONER

## 2024-12-30 PROCEDURE — 93005 ELECTROCARDIOGRAM TRACING: CPT

## 2024-12-30 PROCEDURE — 80053 COMPREHEN METABOLIC PANEL: CPT | Performed by: NURSE PRACTITIONER

## 2024-12-30 PROCEDURE — 93010 ELECTROCARDIOGRAM REPORT: CPT | Mod: ,,, | Performed by: INTERNAL MEDICINE

## 2024-12-31 LAB
OHS QRS DURATION: 68 MS
OHS QTC CALCULATION: 448 MS

## 2024-12-31 NOTE — ED PROVIDER NOTES
Encounter Date: 12/30/2024    SCRIBE #1 NOTE: I, Beronica Lindsay, am scribing for, and in the presence of,  Brian Calvillo MD. I have scribed the following portions of the note - Other sections scribed: HPI, ROS, PE.       History     Chief Complaint   Patient presents with    Dizziness     Near syncopal episode at home while working in kitchen. Pt was standing became dizzy and weak, and was able to lower herself into sitting position. +orthostatic tachy per EMS.      Time seen by provider: 7:59 PM    This is a 69 y.o. female with past medical history of hypertension who presents with complaint of dizziness and near-syncope prior to arrival. Patient notes that the dizziness mostly felt like lightheadedness and not room spinning. She also reports that it only lasted 4-5 minutes and then subsided. She states that she was standing up to go fix her something to eat when the symptoms began. When she felt dizzy, she sat back down on the floor and called her brother to bring her to the ED. She notes not eating all day due to being busy. She reports lightheadedness but denies fainting or any head trauma or injury. She also denies cough, fever, chest pain, palpitations, headache, or arm pain. She does not report any changes to her appetite.  She is compliant with her daily blood pressure medications but she notes that she did not take any today. This is the extent of the patient's complaints at this time.    The history is provided by the patient. No  was used.     Review of patient's allergies indicates:  No Known Allergies  Past Medical History:   Diagnosis Date    BMI 33.0-33.9,adult 1/7/2021    Elevated HDL 02/03/2015    Essential hypertension 11/27/2012    Hyperuricemia 1/7/2021    Obstructive sleep apnea on CPAP     Podagra 1/7/2021    Prediabetes 8/1/2020    Mild.Diet control.    Primary open angle glaucoma (POAG) 10/17/2018    Seasonal allergic rhinitis due to pollen 10/17/2018    Tubular  adenoma of colon (cecum) 12/3/2018    Last C-scope .  Next in 11- 2023    Vaccine refused by parent 8/6/2019    Vitamin D deficiency 10/17/2018     Past Surgical History:   Procedure Laterality Date    COLONOSCOPY  2007    Normal 2007. She should return in 10 years    COLONOSCOPY N/A 11/21/2018    Procedure: COLONOSCOPY;  Surgeon: Brody August MD;  Location: [facility] ENDO (4TH FLR);  Service: Endoscopy;  Laterality: N/A;    COLONOSCOPY N/A 6/13/2024    Procedure: COLONOSCOPY;  Surgeon: Rodney Elmore MD;  Location: [facility] ENDO (4TH FLR);  Service: Endoscopy;  Laterality: N/A;  Referred by Dr. ROXI Sheppard, [facility], emailed to [email or web address]  6/6/24- LVM for precall - ERW  6/11 precall complete -ml    HYSTERECTOMY      Age 27     Family History   Problem Relation Name Age of Onset    Lymphoma Father      Diabetes Mother      Dementia Mother      Anesthesia problems Maternal Uncle      Sleep apnea Brother      Gout Brother      Allergic rhinitis Neg Hx      Allergies Neg Hx      Angioedema Neg Hx      Asthma Neg Hx      Atopy Neg Hx      Eczema Neg Hx      Immunodeficiency Neg Hx      Rhinitis Neg Hx      Urticaria Neg Hx       Social History     Tobacco Use    Smoking status: Never    Smokeless tobacco: Never    Tobacco comments:     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 She exercises by walking about at school and in the stairways.   Substance Use Topics    Alcohol use: Yes     Comment: Social    Drug use: No     Review of Systems   Constitutional:  Negative for fever.   HENT:  Negative for congestion.    Eyes:  Negative for redness.   Respiratory:  Negative for cough and shortness of breath.    Cardiovascular:  Negative for chest pain and palpitations.   Gastrointestinal:  Negative for abdominal pain.   Genitourinary:  Negative for dysuria.   Musculoskeletal:  Negative for myalgias.   Skin:  Negative for rash.   Neurological:  Positive for  dizziness and light-headedness. Negative for headaches.   Psychiatric/Behavioral:  Negative for confusion.        Physical Exam     Initial Vitals [12/30/24 1836]   BP Pulse Resp Temp SpO2   130/78 101 18 98.9 °F (37.2 °C) 99 %      MAP       --         Physical Exam    Constitutional: She appears well-developed and well-nourished. She is not diaphoretic. No distress.   HENT:   Head: Normocephalic and atraumatic.   Eyes: Conjunctivae are normal.   Neck: Neck supple.   Cardiovascular:  Normal rate, regular rhythm, S1 normal, S2 normal, normal heart sounds and intact distal pulses.           No murmur heard.  Pulmonary/Chest: Breath sounds normal. No respiratory distress. She has no wheezes. She has no rhonchi. She has no rales.   Abdominal: Abdomen is soft. There is no abdominal tenderness. There is no rebound and no guarding.   Musculoskeletal:         General: No edema.      Cervical back: Neck supple.     Neurological: She is alert and oriented to person, place, and time.   Skin: Skin is warm and dry.   Psychiatric: She has a normal mood and affect.         ED Course   Procedures  Labs Reviewed   COMPREHENSIVE METABOLIC PANEL - Abnormal       Result Value    Sodium 140      Potassium 4.4      Chloride 105      CO2 28      Glucose 95      BUN 11      Creatinine 1.1      Calcium 9.8      Total Protein 7.7      Albumin 3.7      Total Bilirubin 0.6      Alkaline Phosphatase 100      AST 48 (*)     ALT 8 (*)     eGFR 54 (*)     Anion Gap 7 (*)    CBC W/ AUTO DIFFERENTIAL - Abnormal    WBC 6.18      RBC 5.04      Hemoglobin 14.3      Hematocrit 42.1      MCV 84      MCH 28.4      MCHC 34.0      RDW 12.5      Platelets 350      MPV 9.1 (*)     Immature Granulocytes 0.2      Gran # (ANC) 3.5      Immature Grans (Abs) 0.01      Lymph # 2.0      Mono # 0.5      Eos # 0.1      Baso # 0.03      nRBC 0      Gran % 57.0      Lymph % 31.6      Mono % 8.4      Eosinophil % 2.3      Basophil % 0.5      Differential Method  Automated     MAGNESIUM    Magnesium 1.7     TROPONIN I   TROPONIN I    Troponin I <0.006       EKG Readings: (Independently Interpreted)   Normal sinus rhythm at rate 75, nonspecific flattened T-waves, unchanged from previous       Imaging Results              X-Ray Chest AP Portable (Final result)  Result time 12/30/24 19:32:27      Final result by Milind Mckeon MD (12/30/24 19:32:27)                   Impression:      No acute cardiopulmonary process identified.      Electronically signed by: Milind Mckeon MD  Date:    12/30/2024  Time:    19:32               Narrative:    EXAMINATION:  XR CHEST AP PORTABLE    CLINICAL HISTORY:  weakness;    TECHNIQUE:  Single frontal view of the chest was performed.    COMPARISON:  None    FINDINGS:  Cardiac silhouette is normal in size.  Lungs are symmetrically expanded.  No evidence of focal consolidative process, pneumothorax, or significant pleural effusion.  No acute osseous abnormality identified.                                       Medications   sodium chloride 0.9% bolus 1,000 mL 1,000 mL (1,000 mLs Intravenous Bolus from Bag 12/30/24 2024)     Medical Decision Making      78-year-old female with history of hypertension presents for evaluation via EMS after near syncopal episode.  She was at normal baseline but was busy today so did not eat anything all day.  She stood up to make herself dinner but started to feel lightheaded and that she may pass out, so sat back down to the ground with no complete syncope or trauma.  She denies any associated chest pain, palpitations, or SOB, no recent illness or other associated symptoms.  EMS was called and patient now feels somewhat back to normal other than being hungry.  On exam patient well-appearing in no distress, with mild tachycardia on initial vitals resolved by time of my exam.  She has normal neuro exam, no lower extremity edema or other concerning exam findings.  Differential diagnosis includes dehydration,  orthostatic hypotension, IFEOMA, electrolyte abnormality.      Labs reassuring with no anemia or leukocytosis, normal CMP and renal function, negative troponin. Chest x-ray with no evidence for pulmonary edema, pleural effusion, focal consolidation, or pneumothorax per my independent interpretation.  Patient treated with IVF and given something to eat and on reassessment she feels completely normal with no dizziness with standing, ambulatory with no ataxia or dizziness.  Patient most likely had dehydration and subsequent orthostatic hypotension and near-syncope, no sign of significant cardiopulmonary etiology such as ACS/PE, and no sign of CVA, no indication for further emergent workup.  She is advised on increasing her p.o. hydration and not skipping meals, and on return precautions for any recurrent episodes or other new concerns.        Amount and/or Complexity of Data Reviewed  External Data Reviewed: notes.  Labs: ordered. Decision-making details documented in ED Course.  Radiology: ordered and independent interpretation performed. Decision-making details documented in ED Course.  ECG/medicine tests: ordered and independent interpretation performed. Decision-making details documented in ED Course.            Scribe Attestation:   Scribe #1: I performed the above scribed service and the documentation accurately describes the services I performed. I attest to the accuracy of the note.              I, Dr. Brian Calvillo, personally performed the services described in this documentation. All medical record entries made by the scribe were at my direction and in my presence.  I have reviewed the chart and agree that the record reflects my personal performance and is accurate and complete. Brian Calvillo MD.                      Clinical Impression:  Final diagnoses:  [R42] Dizziness  [R55] Near syncope (Primary)  [E86.0] Dehydration          ED Disposition Condition    Discharge Stable          ED Prescriptions     None       Follow-up Information       Follow up With Specialties Details Why Contact Info    [facility] Emergency Medicine Go to  [facility] symptoms worsen [address] [facility] [id number] [phone]             Brian Calvillo MD  12/31/24 5001

## 2024-12-31 NOTE — ED NOTES
Razia Keyes, a 69 y.o. female presents to the ED w/ complaint of dizziness that started earlier today. Pt believes she had a near syncopal because she was very busy all day and didn't get a chance to eat all day.     Triage note:  Chief Complaint   Patient presents with    Dizziness     Near syncopal episode at home while working in kitchen. Pt was standing became dizzy and weak, and was able to lower herself into sitting position. +orthostatic tachy per EMS.      Review of patient's allergies indicates:  No Known Allergies  Past Medical History:   Diagnosis Date    BMI 33.0-33.9,adult 1/7/2021    Elevated HDL 02/03/2015    Essential hypertension 11/27/2012    Hyperuricemia 1/7/2021    Obstructive sleep apnea on CPAP     Podagra 1/7/2021    Prediabetes 8/1/2020    Mild.Diet control.    Primary open angle glaucoma (POAG) 10/17/2018    Seasonal allergic rhinitis due to pollen 10/17/2018    Tubular adenoma of colon (cecum) 12/3/2018    Last C-scope .  Next in 11- 2023    Vaccine refused by parent 8/6/2019    Vitamin D deficiency 10/17/2018

## 2025-05-20 DIAGNOSIS — E55.9 VITAMIN D DEFICIENCY: ICD-10-CM

## 2025-05-20 DIAGNOSIS — I10 ESSENTIAL HYPERTENSION: Chronic | ICD-10-CM

## 2025-05-20 DIAGNOSIS — M10.9 PODAGRA: ICD-10-CM

## 2025-05-20 DIAGNOSIS — E79.0 HYPERURICEMIA: ICD-10-CM

## 2025-05-20 RX ORDER — ATENOLOL 50 MG/1
50 TABLET ORAL DAILY
Qty: 90 TABLET | Refills: 0 | Status: SHIPPED | OUTPATIENT
Start: 2025-05-20 | End: 2026-05-20

## 2025-05-20 RX ORDER — ALLOPURINOL 100 MG/1
100 TABLET ORAL DAILY
Qty: 90 TABLET | Refills: 0 | Status: SHIPPED | OUTPATIENT
Start: 2025-05-20

## 2025-05-20 RX ORDER — SPIRONOLACTONE 25 MG/1
25 TABLET ORAL DAILY
Qty: 90 TABLET | Refills: 0 | Status: SHIPPED | OUTPATIENT
Start: 2025-05-20

## 2025-05-20 RX ORDER — ERGOCALCIFEROL 1.25 MG/1
50000 CAPSULE ORAL
Qty: 12 CAPSULE | Refills: 0 | Status: SHIPPED | OUTPATIENT
Start: 2025-05-20

## 2025-05-20 NOTE — TELEPHONE ENCOUNTER
Care Due:                  Date            Visit Type   Department     Provider  --------------------------------------------------------------------------------                                MYCHART                              ANNUAL                              CHECKUP/PHY  [facility] INTERNAL  Last Visit: 05-      [facility]       Shaq Sheppard  Next Visit: None Scheduled  None         None Found                                                            Last  Test          Frequency    Reason                     Performed    Due Date  --------------------------------------------------------------------------------    Office Visit  12 months..  ergocalciferol...........  05- 05-    Uric Acid...  12 months..  allopurinoL..............  05- 05-    Vitamin D...  12 months..  ergocalciferol...........  05- 05-    [employer] Embedded Care Due Messages. Reference number: [id number].   5/20/2025 10:20:13 AM CDT

## 2025-06-03 ENCOUNTER — OFFICE VISIT (OUTPATIENT)
Dept: INTERNAL MEDICINE | Facility: CLINIC | Age: 70
End: 2025-06-03
Payer: COMMERCIAL

## 2025-06-03 VITALS
HEART RATE: 81 BPM | WEIGHT: 172.19 LBS | OXYGEN SATURATION: 99 % | BODY MASS INDEX: 30.51 KG/M2 | SYSTOLIC BLOOD PRESSURE: 139 MMHG | DIASTOLIC BLOOD PRESSURE: 80 MMHG | HEIGHT: 63 IN

## 2025-06-03 DIAGNOSIS — G47.33 OBSTRUCTIVE SLEEP APNEA ON CPAP: ICD-10-CM

## 2025-06-03 DIAGNOSIS — Z12.31 ENCOUNTER FOR SCREENING MAMMOGRAM FOR BREAST CANCER: ICD-10-CM

## 2025-06-03 DIAGNOSIS — J30.1 SEASONAL ALLERGIC RHINITIS DUE TO POLLEN: Chronic | ICD-10-CM

## 2025-06-03 DIAGNOSIS — Z28.82 VACCINE REFUSED BY PARENT: ICD-10-CM

## 2025-06-03 DIAGNOSIS — L30.8 OTHER ECZEMA: ICD-10-CM

## 2025-06-03 DIAGNOSIS — H40.1194 PRIMARY OPEN-ANGLE GLAUCOMA, INDETERMINATE STAGE, UNSPECIFIED LATERALITY: Chronic | ICD-10-CM

## 2025-06-03 DIAGNOSIS — E79.0 HYPERURICEMIA: ICD-10-CM

## 2025-06-03 DIAGNOSIS — Z00.00 ANNUAL PHYSICAL EXAM: Primary | ICD-10-CM

## 2025-06-03 DIAGNOSIS — E55.9 VITAMIN D DEFICIENCY: ICD-10-CM

## 2025-06-03 DIAGNOSIS — I10 ESSENTIAL HYPERTENSION: Chronic | ICD-10-CM

## 2025-06-03 DIAGNOSIS — Z86.0100 HISTORY OF COLON POLYPS: ICD-10-CM

## 2025-06-03 DIAGNOSIS — L81.4 SOLAR LENTIGO: ICD-10-CM

## 2025-06-03 DIAGNOSIS — R73.03 PREDIABETES: ICD-10-CM

## 2025-06-03 DIAGNOSIS — M10.9 PODAGRA: ICD-10-CM

## 2025-06-03 PROCEDURE — 3288F FALL RISK ASSESSMENT DOCD: CPT | Mod: CPTII,S$GLB,, | Performed by: INTERNAL MEDICINE

## 2025-06-03 PROCEDURE — 1101F PT FALLS ASSESS-DOCD LE1/YR: CPT | Mod: CPTII,S$GLB,, | Performed by: INTERNAL MEDICINE

## 2025-06-03 PROCEDURE — 99999 PR PBB SHADOW E&M-EST. PATIENT-LVL III: CPT | Mod: PBBFAC,,, | Performed by: INTERNAL MEDICINE

## 2025-06-03 PROCEDURE — 1126F AMNT PAIN NOTED NONE PRSNT: CPT | Mod: CPTII,S$GLB,, | Performed by: INTERNAL MEDICINE

## 2025-06-03 PROCEDURE — 99214 OFFICE O/P EST MOD 30 MIN: CPT | Mod: S$GLB,,, | Performed by: INTERNAL MEDICINE

## 2025-06-03 PROCEDURE — 3075F SYST BP GE 130 - 139MM HG: CPT | Mod: CPTII,S$GLB,, | Performed by: INTERNAL MEDICINE

## 2025-06-03 PROCEDURE — 3079F DIAST BP 80-89 MM HG: CPT | Mod: CPTII,S$GLB,, | Performed by: INTERNAL MEDICINE

## 2025-06-03 PROCEDURE — 3008F BODY MASS INDEX DOCD: CPT | Mod: CPTII,S$GLB,, | Performed by: INTERNAL MEDICINE

## 2025-06-03 RX ORDER — ERGOCALCIFEROL 1.25 MG/1
50000 CAPSULE ORAL
Qty: 12 CAPSULE | Refills: 3 | Status: SHIPPED | OUTPATIENT
Start: 2025-06-03

## 2025-06-03 RX ORDER — SPIRONOLACTONE 25 MG/1
25 TABLET ORAL DAILY
Qty: 90 TABLET | Refills: 3 | Status: SHIPPED | OUTPATIENT
Start: 2025-06-03

## 2025-06-03 RX ORDER — CLOBETASOL PROPIONATE 0.5 MG/G
CREAM TOPICAL 2 TIMES DAILY
Qty: 60 G | Refills: 11 | Status: SHIPPED | OUTPATIENT
Start: 2025-06-03

## 2025-06-03 RX ORDER — ATENOLOL 100 MG/1
100 TABLET ORAL DAILY
Qty: 90 TABLET | Refills: 3 | Status: SHIPPED | OUTPATIENT
Start: 2025-06-03

## 2025-06-03 RX ORDER — HYDROQUINONE 40 MG/G
CREAM TOPICAL 2 TIMES DAILY
Qty: 57 G | Refills: 2 | Status: SHIPPED | OUTPATIENT
Start: 2025-06-03

## 2025-06-03 RX ORDER — ALLOPURINOL 100 MG/1
100 TABLET ORAL DAILY
Qty: 90 TABLET | Refills: 3 | Status: SHIPPED | OUTPATIENT
Start: 2025-06-03

## 2025-06-04 ENCOUNTER — LAB VISIT (OUTPATIENT)
Dept: LAB | Facility: HOSPITAL | Age: 70
End: 2025-06-04
Attending: INTERNAL MEDICINE
Payer: COMMERCIAL

## 2025-06-04 DIAGNOSIS — M10.9 PODAGRA: ICD-10-CM

## 2025-06-04 DIAGNOSIS — R73.03 PREDIABETES: ICD-10-CM

## 2025-06-04 DIAGNOSIS — I10 ESSENTIAL HYPERTENSION: ICD-10-CM

## 2025-06-04 DIAGNOSIS — E79.0 HYPERURICEMIA: ICD-10-CM

## 2025-06-04 DIAGNOSIS — Z00.00 ANNUAL PHYSICAL EXAM: ICD-10-CM

## 2025-06-04 LAB
ABSOLUTE EOSINOPHIL (OHS): 0.2 K/UL
ABSOLUTE MONOCYTE (OHS): 0.49 K/UL (ref 0.3–1)
ABSOLUTE NEUTROPHIL COUNT (OHS): 2.5 K/UL (ref 1.8–7.7)
ALBUMIN SERPL BCP-MCNC: 3.7 G/DL (ref 3.5–5.2)
ALP SERPL-CCNC: 92 UNIT/L (ref 40–150)
ALT SERPL W/O P-5'-P-CCNC: 12 UNIT/L (ref 10–44)
ANION GAP (OHS): 10 MMOL/L (ref 8–16)
AST SERPL-CCNC: 71 UNIT/L (ref 11–45)
BASOPHILS # BLD AUTO: 0.03 K/UL
BASOPHILS NFR BLD AUTO: 0.5 %
BILIRUB SERPL-MCNC: 0.6 MG/DL (ref 0.1–1)
BUN SERPL-MCNC: 13 MG/DL (ref 8–23)
CALCIUM SERPL-MCNC: 9.2 MG/DL (ref 8.7–10.5)
CHLORIDE SERPL-SCNC: 104 MMOL/L (ref 95–110)
CHOLEST SERPL-MCNC: 209 MG/DL (ref 120–199)
CHOLEST/HDLC SERPL: 2.4 {RATIO} (ref 2–5)
CO2 SERPL-SCNC: 25 MMOL/L (ref 23–29)
CREAT SERPL-MCNC: 1.1 MG/DL (ref 0.5–1.4)
EAG (OHS): 111 MG/DL (ref 68–131)
ERYTHROCYTE [DISTWIDTH] IN BLOOD BY AUTOMATED COUNT: 12.6 % (ref 11.5–14.5)
GFR SERPLBLD CREATININE-BSD FMLA CKD-EPI: 54 ML/MIN/1.73/M2
GLUCOSE SERPL-MCNC: 69 MG/DL (ref 70–110)
HBA1C MFR BLD: 5.5 % (ref 4–5.6)
HCT VFR BLD AUTO: 41.7 % (ref 37–48.5)
HDLC SERPL-MCNC: 88 MG/DL (ref 40–75)
HDLC SERPL: 42.1 % (ref 20–50)
HGB BLD-MCNC: 13 GM/DL (ref 12–16)
IMM GRANULOCYTES # BLD AUTO: 0.01 K/UL (ref 0–0.04)
IMM GRANULOCYTES NFR BLD AUTO: 0.2 % (ref 0–0.5)
LDLC SERPL CALC-MCNC: 110.8 MG/DL (ref 63–159)
LYMPHOCYTES # BLD AUTO: 2.49 K/UL (ref 1–4.8)
MCH RBC QN AUTO: 27.4 PG (ref 27–31)
MCHC RBC AUTO-ENTMCNC: 31.2 G/DL (ref 32–36)
MCV RBC AUTO: 88 FL (ref 82–98)
NONHDLC SERPL-MCNC: 121 MG/DL
NUCLEATED RBC (/100WBC) (OHS): 0 /100 WBC
PLATELET # BLD AUTO: 343 K/UL (ref 150–450)
PMV BLD AUTO: 9.6 FL (ref 9.2–12.9)
POTASSIUM SERPL-SCNC: 4.5 MMOL/L (ref 3.5–5.1)
PROT SERPL-MCNC: 7.4 GM/DL (ref 6–8.4)
RBC # BLD AUTO: 4.74 M/UL (ref 4–5.4)
RELATIVE EOSINOPHIL (OHS): 3.5 %
RELATIVE LYMPHOCYTE (OHS): 43.5 % (ref 18–48)
RELATIVE MONOCYTE (OHS): 8.6 % (ref 4–15)
RELATIVE NEUTROPHIL (OHS): 43.7 % (ref 38–73)
SODIUM SERPL-SCNC: 139 MMOL/L (ref 136–145)
TRIGL SERPL-MCNC: 51 MG/DL (ref 30–150)
TSH SERPL-ACNC: 1.56 UIU/ML (ref 0.4–4)
URATE SERPL-MCNC: 5.7 MG/DL (ref 2.4–5.7)
WBC # BLD AUTO: 5.72 K/UL (ref 3.9–12.7)

## 2025-06-04 PROCEDURE — 85025 COMPLETE CBC W/AUTO DIFF WBC: CPT

## 2025-06-04 PROCEDURE — 36415 COLL VENOUS BLD VENIPUNCTURE: CPT

## 2025-06-04 PROCEDURE — 84550 ASSAY OF BLOOD/URIC ACID: CPT

## 2025-06-04 PROCEDURE — 83036 HEMOGLOBIN GLYCOSYLATED A1C: CPT

## 2025-06-04 PROCEDURE — 82465 ASSAY BLD/SERUM CHOLESTEROL: CPT

## 2025-06-04 PROCEDURE — 84443 ASSAY THYROID STIM HORMONE: CPT

## 2025-06-04 PROCEDURE — 82310 ASSAY OF CALCIUM: CPT

## 2025-06-05 ENCOUNTER — RESULTS FOLLOW-UP (OUTPATIENT)
Dept: INTERNAL MEDICINE | Facility: CLINIC | Age: 70
End: 2025-06-05

## 2025-06-05 ENCOUNTER — TELEPHONE (OUTPATIENT)
Dept: DERMATOLOGY | Facility: CLINIC | Age: 70
End: 2025-06-05
Payer: COMMERCIAL

## 2025-06-06 ENCOUNTER — TELEPHONE (OUTPATIENT)
Dept: DERMATOLOGY | Facility: CLINIC | Age: 70
End: 2025-06-06
Payer: COMMERCIAL

## 2025-06-09 ENCOUNTER — TELEPHONE (OUTPATIENT)
Dept: DERMATOLOGY | Facility: CLINIC | Age: 70
End: 2025-06-09
Payer: COMMERCIAL

## 2025-06-26 ENCOUNTER — HOSPITAL ENCOUNTER (OUTPATIENT)
Dept: RADIOLOGY | Facility: HOSPITAL | Age: 70
Discharge: HOME | End: 2025-06-26
Attending: INTERNAL MEDICINE
Payer: COMMERCIAL

## 2025-06-26 VITALS — WEIGHT: 180 LBS | HEIGHT: 63 IN | BODY MASS INDEX: 31.89 KG/M2

## 2025-06-26 DIAGNOSIS — Z12.31 ENCOUNTER FOR SCREENING MAMMOGRAM FOR BREAST CANCER: ICD-10-CM

## 2025-06-26 PROCEDURE — 77067 SCR MAMMO BI INCL CAD: CPT | Mod: TC

## 2025-07-01 ENCOUNTER — TELEPHONE (OUTPATIENT)
Dept: SLEEP MEDICINE | Facility: CLINIC | Age: 70
End: 2025-07-01
Payer: COMMERCIAL